# Patient Record
Sex: FEMALE | Race: BLACK OR AFRICAN AMERICAN | NOT HISPANIC OR LATINO | Employment: FULL TIME | URBAN - METROPOLITAN AREA
[De-identification: names, ages, dates, MRNs, and addresses within clinical notes are randomized per-mention and may not be internally consistent; named-entity substitution may affect disease eponyms.]

---

## 2018-03-04 ENCOUNTER — HOSPITAL ENCOUNTER (EMERGENCY)
Facility: HOSPITAL | Age: 28
Discharge: HOME | End: 2018-03-04
Attending: EMERGENCY MEDICINE

## 2018-03-04 ENCOUNTER — APPOINTMENT (EMERGENCY)
Dept: RADIOLOGY | Facility: HOSPITAL | Age: 28
End: 2018-03-04

## 2018-03-04 VITALS
RESPIRATION RATE: 16 BRPM | SYSTOLIC BLOOD PRESSURE: 103 MMHG | WEIGHT: 165 LBS | DIASTOLIC BLOOD PRESSURE: 53 MMHG | HEIGHT: 66 IN | TEMPERATURE: 97.4 F | HEART RATE: 78 BPM | BODY MASS INDEX: 26.52 KG/M2 | OXYGEN SATURATION: 95 %

## 2018-03-04 DIAGNOSIS — M25.512 LEFT SHOULDER PAIN: Primary | ICD-10-CM

## 2018-03-04 PROCEDURE — 73030 X-RAY EXAM OF SHOULDER: CPT

## 2018-03-04 PROCEDURE — 99283 EMERGENCY DEPT VISIT LOW MDM: CPT

## 2018-03-04 RX ORDER — CYCLOBENZAPRINE HCL 10 MG
10 TABLET ORAL 3 TIMES DAILY PRN
Qty: 21 TABLET | Refills: 0 | Status: SHIPPED | OUTPATIENT
Start: 2018-03-04 | End: 2020-03-02

## 2018-03-04 RX ORDER — NAPROXEN 250 MG/1
500 TABLET ORAL ONCE
Status: COMPLETED | OUTPATIENT
Start: 2018-03-04 | End: 2018-03-04

## 2018-03-04 RX ORDER — ACETAMINOPHEN 325 MG/1
975 TABLET ORAL ONCE
Status: COMPLETED | OUTPATIENT
Start: 2018-03-04 | End: 2018-03-04

## 2018-03-04 RX ORDER — CYCLOBENZAPRINE HCL 10 MG
10 TABLET ORAL ONCE
Status: COMPLETED | OUTPATIENT
Start: 2018-03-04 | End: 2018-03-04

## 2018-03-04 RX ORDER — LIDOCAINE 50 MG/G
1 PATCH TOPICAL ONCE
Status: DISCONTINUED | OUTPATIENT
Start: 2018-03-04 | End: 2018-03-04 | Stop reason: HOSPADM

## 2018-03-04 RX ORDER — NAPROXEN 500 MG/1
500 TABLET ORAL 2 TIMES DAILY WITH MEALS
Qty: 20 TABLET | Refills: 0 | Status: SHIPPED | OUTPATIENT
Start: 2018-03-04 | End: 2020-03-02

## 2018-03-04 RX ADMIN — ACETAMINOPHEN 975 MG: 325 TABLET, FILM COATED ORAL at 12:38

## 2018-03-04 RX ADMIN — NAPROXEN 500 MG: 250 TABLET ORAL at 12:39

## 2018-03-04 RX ADMIN — CYCLOBENZAPRINE HYDROCHLORIDE 10 MG: 10 TABLET, FILM COATED ORAL at 12:39

## 2018-03-04 RX ADMIN — LIDOCAINE 1 PATCH: 50 PATCH TOPICAL at 12:40

## 2018-03-04 NOTE — ED PROVIDER NOTES
History  Chief Complaint   Patient presents with    Arm Pain     patient is c/o left arm pain since thursday  thinks she might have injured it carrying boxes at work on thursday  but unsure  denies radiation to chest or neck     59-year-old female without past medical history right-hand dominant presenting with chief complaint of left shoulder injury  Patient was at work on Thursday where she works as a  she went downstairs to grab a box of liquor and bring it back up stairs she states she placed on her left shoulder she has had after this she had significant pain localized to her anterior lateral shoulder, it got progressively worse since that time again is localized to her anterior lateral shoulder does radiate into her proximal humerus is worse when she tries to flex and abduct the arm she has difficulty with range of motion she has difficulty sleeping on that side because it is so uncomfortable she has minimal discomfort at rest, is reproduced with movement is localized to left shoulder only does not go into her chest or back or neck she states that no other exacerbating other than it is improved with Tylenol  No other associated symptoms she is not reported this to employer her last normal period was 2 weeks ago            None       History reviewed  No pertinent past medical history  Past Surgical History:   Procedure Laterality Date    DILATION AND CURETTAGE OF UTERUS         History reviewed  No pertinent family history  I have reviewed and agree with the history as documented  Social History   Substance Use Topics    Smoking status: Current Every Day Smoker     Packs/day: 0 50     Types: Cigarettes    Smokeless tobacco: Never Used    Alcohol use No        Review of Systems   Constitutional: Negative for chills and fever  Respiratory: Negative for cough and shortness of breath  Cardiovascular: Negative for chest pain and palpitations     Gastrointestinal: Negative for abdominal pain, nausea and vomiting  Genitourinary: Negative for dysuria, frequency, menstrual problem and urgency  Musculoskeletal: Negative for arthralgias, back pain, joint swelling, myalgias, neck pain and neck stiffness  Left shoulder pain   Skin: Negative for color change, rash and wound  Neurological: Negative for dizziness, weakness, light-headedness and headaches  Tingling and left arm and finger tips   Hematological: Negative for adenopathy  Does not bruise/bleed easily  Psychiatric/Behavioral: Negative for agitation and behavioral problems  All other systems reviewed and are negative  Physical Exam  ED Triage Vitals [03/04/18 1048]   Temperature Pulse Respirations Blood Pressure SpO2   (!) 97 4 °F (36 3 °C) 78 16 103/53 95 %      Temp Source Heart Rate Source Patient Position - Orthostatic VS BP Location FiO2 (%)   Oral Monitor Sitting Right arm --      Pain Score       8           Orthostatic Vital Signs  Vitals:    03/04/18 1048   BP: 103/53   Pulse: 78   Patient Position - Orthostatic VS: Sitting       Physical Exam   Constitutional: She is oriented to person, place, and time  She appears well-developed and well-nourished  No distress  HENT:   Head: Normocephalic and atraumatic  Eyes: EOM are normal  Pupils are equal, round, and reactive to light  Neck: Normal range of motion  Neck supple  No tracheal deviation present  Cardiovascular: Normal rate, regular rhythm and normal heart sounds  Exam reveals no gallop and no friction rub  No murmur heard  Pulmonary/Chest: Effort normal and breath sounds normal  She has no wheezes  She has no rales     Musculoskeletal:   Patient has minimal discomfort with passive range of motion she has significant discomfort or limitation with flexion abducting the left arm at the shoulder she has pain with external rotation there is no erythema warmth or swelling she has no tenderness over the humerus elbow or hand distal left upper extremities neurovascularly intact including ain PI and wrist flexion extension sensation is symmetric although again she states she has tingling all for fingers, there is no tenderness over her clavicle or scapula no deformity of the shoulder otherwise unremarkable muscle skeletal exam   Neurological: She is alert and oriented to person, place, and time  No cranial nerve deficit  She exhibits normal muscle tone  Coordination normal    Skin: Skin is warm and dry  No rash noted  Psychiatric: She has a normal mood and affect  Her behavior is normal    Nursing note and vitals reviewed  ED Medications  Medications   naproxen (NAPROSYN) tablet 500 mg (500 mg Oral Given 3/4/18 1239)   acetaminophen (TYLENOL) tablet 975 mg (975 mg Oral Given 3/4/18 1238)   cyclobenzaprine (FLEXERIL) tablet 10 mg (10 mg Oral Given 3/4/18 1239)       Diagnostic Studies  Results Reviewed     None                 XR shoulder 2+ views LEFT   ED Interpretation by Mayuri Roque DO (03/04 1325)   No acute abnormality      Final Result by Taisha Tovar MD (03/04 1342)      No acute osseous abnormality           Workstation performed: [id number]                    Procedures  Procedures       Phone Contacts  ED Phone Contact    ED Course  ED Course as of Mar 04 2241   Jaison Stringer Mar 04, 2018   1343 Will follow up with occupational medicine close return instructions patient agreeable plan, will follow up with occupational medicine this was identified is work related injury                                MDM  Number of Diagnoses or Management Options  Left shoulder pain:   Diagnosis management comments: 78-year-old female right-hand dominant presenting chief complaint of left shoulder injury that occurred while at work, pain is localized to her left anterior lateral shoulder worse when she tries to flex or abduct the arm is very reproducible, she does no tingling in her fingers but no neck pain no radiation to chest no other neurologic complaints on exam here she is afebrile normal vital signs she has reproducible left shoulder pain without swelling redness warmth or other acute infectious ischemic or inflammatory findings distal lower left upper extremitie objectively neurovascularly intact, will get x-ray of left shoulder, will treat symptomatically she will require Occupational Medicine follow-up return instructions as this is identified is work related injury    [address] Time    Disposition  Final diagnoses:   Left shoulder pain     Time reflects when diagnosis was documented in both MDM as applicable and the Disposition within this note     Time User Action Codes Description Comment    3/4/2018  1:45 PM Chayito Campbell Add [K03 561] Left shoulder pain       ED Disposition     ED Disposition Condition Comment    Discharge  Arturo Garcia discharge to home/self care  Condition at discharge: Good        Follow-up Information     Follow up With Specialties Details Why 14 [facility] Emergency Department Emergency Medicine  If symptoms worsen 34 [facility] [place] ED, [address] [place], [place], [address] In 2 days  [address], [address] [place] [address], [id number]        Discharge Medication List as of 3/4/2018  1:47 PM      START taking these medications    Details   cyclobenzaprine (FLEXERIL) 10 mg tablet Take 1 tablet (10 mg total) by mouth 3 (three) times a day as needed for muscle spasms for up to 7 doses, Starting Sun 3/4/2018, Print      naproxen (NAPROSYN) 500 mg tablet Take 1 tablet (500 mg total) by mouth 2 (two) times a day with meals, Starting Sun 3/4/2018, Print           No discharge procedures on file      ED Provider  Electronically Signed by           Jarad Reyna DO  03/04/18 1513

## 2018-03-04 NOTE — DISCHARGE INSTRUCTIONS
Please continue current treatment rest ice follow up with occupational medicine for further evaluation and re-evaluation which may include orthopedic evaluation physical therapy etc please notify your employer that this was a work related injury as discussed     Shoulder Pain, Ambulatory Care   GENERAL INFORMATION:   Shoulder pain  is a common problem and can affect your daily activities  Pain can be caused by a problem within your shoulder  Shoulder pain may also be caused by pain that spreads to your shoulder from another part of your body  Seek immediate care for the following symptoms:   · Severe pain    · Inability to move your arm or shoulder    · Numbness or tingling in your shoulder or arm  Treatment for shoulder pain  may include any of the following:  · Acetaminophen  decreases pain and fever  It is available without a doctor's order  Ask how much to take and how often to take it  Follow directions  Acetaminophen can cause liver damage if not taken correctly  · NSAIDs  help decrease swelling and pain or fever  This medicine is available with or without a doctor's order  NSAIDs can cause stomach bleeding or kidney problems in certain people  If you take blood thinner medicine, always ask your healthcare provider if NSAIDs are safe for you  Always read the medicine label and follow directions  · A steroid injection  may help decrease pain and swelling  · Surgery  may be needed for long-term pain and loss of function  Manage your symptoms:   · Apply ice  on your shoulder for 20 to 30 minutes every 2 hours or as directed  Use an ice pack, or put crushed ice in a plastic bag  Cover it with a towel  Ice helps prevent tissue damage and decreases swelling and pain  · Apply heat if ice does not help your symptoms  Apply heat on your shoulder for 20 to 30 minutes every 2 hours for as many days as directed  Heat helps decrease pain and muscle spasms      · Go to physical or occupational therapy as directed  A physical therapist teaches you exercises to help improve movement and strength, and to decrease pain  An occupational therapist teaches you skills to help with your daily activities  Prevent shoulder pain:   · Stretch and strengthen your shoulder  Use proper technique during exercises and sports  · Limit activities as directed  Try to avoid repeated overhead movements  Follow up with your healthcare provider or orthopedist as directed:  Write down your questions so you remember to ask them during your visits  CARE AGREEMENT:   You have the right to help plan your care  Learn about your health condition and how it may be treated  Discuss treatment options with your caregivers to decide what care you want to receive  You always have the right to refuse treatment  The above information is an  only  It is not intended as medical advice for individual conditions or treatments  Talk to your doctor, nurse or pharmacist before following any medical regimen to see if it is safe and effective for you  © 2014 [address] is for End User's use only and may not be sold, redistributed or otherwise used for commercial purposes  All illustrations and images included in CareNotes® are the copyrighted property of [employer] M [employer]  or [employer]

## 2018-09-18 ENCOUNTER — APPOINTMENT (EMERGENCY)
Dept: RADIOLOGY | Facility: HOSPITAL | Age: 28
End: 2018-09-18

## 2018-09-18 ENCOUNTER — HOSPITAL ENCOUNTER (EMERGENCY)
Facility: HOSPITAL | Age: 28
Discharge: HOME | End: 2018-09-19
Attending: EMERGENCY MEDICINE | Admitting: EMERGENCY MEDICINE

## 2018-09-18 VITALS
HEART RATE: 72 BPM | TEMPERATURE: 98.1 F | BODY MASS INDEX: 25.83 KG/M2 | DIASTOLIC BLOOD PRESSURE: 56 MMHG | OXYGEN SATURATION: 99 % | WEIGHT: 160.05 LBS | SYSTOLIC BLOOD PRESSURE: 106 MMHG | RESPIRATION RATE: 18 BRPM

## 2018-09-18 DIAGNOSIS — S63.501A RIGHT WRIST SPRAIN: Primary | ICD-10-CM

## 2018-09-18 PROCEDURE — 73110 X-RAY EXAM OF WRIST: CPT

## 2018-09-19 PROBLEM — S63.501A RIGHT WRIST SPRAIN: Status: ACTIVE | Noted: 2018-09-19

## 2018-09-19 PROCEDURE — 99283 EMERGENCY DEPT VISIT LOW MDM: CPT

## 2018-09-19 NOTE — ED PROVIDER NOTES
History  Chief Complaint   Patient presents with    Hand Pain     Patient reports hand/wrist pain that radiates up arm  Patient reports that she injured it a few months ago and continues to have pain   Pain with Swallowing     Patient reports that she has difficulty swallowing some foods  Patient's 70-year-old female with complaints of right wrist pain that began approximately 5 months ago  Patient states that she fell on her wrist causing an injury  Patient states she has had persistent pain  Patient did not have an initial evaluation because she did not have insurance  Patient states that she notices swelling in her right wrist   She denies any numbness or tingling  History provided by:  Patient   used: No    Hand Pain   Associated symptoms: no chest pain, no fever and no shortness of breath    Wrist Pain   Location:  Right wrist  Severity:  Moderate  Duration:  5 months  Progression:  Unchanged  Relieved by:  Rest  Worsened by: Movement  Associated symptoms: no chest pain, no fever and no shortness of breath        Prior to Admission Medications   Prescriptions Last Dose Informant Patient Reported? Taking? cyclobenzaprine (FLEXERIL) 10 mg tablet   No No   Sig: Take 1 tablet (10 mg total) by mouth 3 (three) times a day as needed for muscle spasms for up to 7 doses   naproxen (NAPROSYN) 500 mg tablet   No No   Sig: Take 1 tablet (500 mg total) by mouth 2 (two) times a day with meals      Facility-Administered Medications: None       History reviewed  No pertinent past medical history  Past Surgical History:   Procedure Laterality Date    DILATION AND CURETTAGE OF UTERUS         History reviewed  No pertinent family history  I have reviewed and agree with the history as documented      Social History   Substance Use Topics    Smoking status: Current Every Day Smoker     Packs/day: 0 50     Types: Cigarettes    Smokeless tobacco: Never Used    Alcohol use Yes Comment: socially        Review of Systems   Constitutional: Negative for fever  Respiratory: Negative for shortness of breath  Cardiovascular: Negative for chest pain  Musculoskeletal: Positive for joint swelling  All other systems reviewed and are negative  Physical Exam  Physical Exam   Constitutional: She is oriented to person, place, and time  She appears well-developed and well-nourished  HENT:   Head: Normocephalic and atraumatic  Right Ear: External ear normal    Left Ear: External ear normal    Nose: Nose normal    Mouth/Throat: Oropharynx is clear and moist    Eyes: Conjunctivae and EOM are normal  Pupils are equal, round, and reactive to light  Neck: Normal range of motion  Cardiovascular: Normal rate, regular rhythm and normal heart sounds  Pulmonary/Chest: Effort normal and breath sounds normal    Musculoskeletal:        Right wrist: She exhibits tenderness and bony tenderness  She exhibits no swelling  Arms:  Neurological: She is alert and oriented to person, place, and time  Skin: Skin is warm  Vitals reviewed        Vital Signs  ED Triage Vitals [09/18/18 2220]   Temperature Pulse Respirations Blood Pressure SpO2   98 1 °F (36 7 °C) 72 18 106/56 99 %      Temp Source Heart Rate Source Patient Position - Orthostatic VS BP Location FiO2 (%)   Oral Monitor Sitting Left arm --      Pain Score       4           Vitals:    09/18/18 2220   BP: 106/56   Pulse: 72   Patient Position - Orthostatic VS: Sitting       Visual Acuity      ED Medications  Medications - No data to display    Diagnostic Studies  Results Reviewed     None                 XR wrist 3+ views RIGHT    (Results Pending)              Procedures  Procedures       Phone Contacts  ED Phone Contact    ED Course                               MDM  Number of Diagnoses or Management Options  Right wrist sprain:   Diagnosis management comments: DDX includes but not ltd to:  Right scaphoid fracture, right distal radius fracture, right wrist sprain  X-ray images of right wrist were reviewed and that show any acute bony abnormality  Recommended follow-up with Orthopedics for further evaluation  Patient was placed in a wrist brace for comfort and protection  Amount and/or Complexity of Data Reviewed  Tests in the radiology section of CPT®: ordered and reviewed  Independent visualization of images, tracings, or specimens: yes      CritCare Time    Disposition  Final diagnoses:   Right wrist sprain     Time reflects when diagnosis was documented in both MDM as applicable and the Disposition within this note     Time User Action Codes Description Comment    9/19/2018 12:12 AM Gini Villarreal Add [S63 501A] Right wrist sprain       ED Disposition     ED Disposition Condition Comment    Discharge  Nishant Foil discharge to home/self care  Condition at discharge: Good        Follow-up Information     Follow up With Specialties Details Why [address] [facility] Orthopedic Surgery In 1 week  [address] [facility] [address]  [phone]          Patient's Medications   Discharge Prescriptions    No medications on file     No discharge procedures on file      ED Provider  Electronically Signed by           Hema Kang PA-C  09/19/18 0026

## 2018-09-19 NOTE — DISCHARGE INSTRUCTIONS
Sprain   WHAT YOU NEED TO KNOW:   A sprain happens when a ligament is stretched or torn  Ligaments are tough tissues that connect bones  Ligaments support your joints and keep your bones in place  They allow you to lift, lower, or rotate your arms and legs  A sprain may involve one or more ligaments  DISCHARGE INSTRUCTIONS:   Seek care immediately if:   · You have numbness or tingling below the injury, such as in your fingers or toes  · The skin over your sprained area is blue or pale  · Your pain has increased or returned, even after you take pain medicine  Contact your healthcare provider if:   · Your symptoms do not better  · Your swelling has increased or returned  · Your joint becomes more weak or unstable  · You have questions or concerns about your condition or care  Medicines:   · Prescription pain medicine  may be given  Ask how to take this medicine safely  · Acetaminophen  decreases pain and fever  It is available without a doctor's order  Ask how much to take and how often to take it  Follow directions  Acetaminophen can cause liver damage if not taken correctly  · NSAIDs , such as ibuprofen, help decrease swelling, pain, and fever  This medicine is available with or without a doctor's order  NSAIDs can cause stomach bleeding or kidney problems in certain people  If you take blood thinner medicine, always ask your healthcare provider if NSAIDs are safe for you  Always read the medicine label and follow directions  · Take your medicine as directed  Contact your healthcare provider if you think your medicine is not helping or if you have side effects  Tell him or her if you are allergic to any medicine  Keep a list of the medicines, vitamins, and herbs you take  Include the amounts, and when and why you take them  Bring the list or the pill bottles to follow-up visits  Carry your medicine list with you in case of an emergency    Support devices:  Support services, such as an elastic bandage, splint, brace, or cast may be needed  These devices limit your movement and protect your joint  You may need to use crutches if the sprain is in your leg  This will help decrease your pain as you move around  Self-care:   · Rest  your joint so that it can heal  Return to normal activities as directed  · Apply ice  on your injury for 15 to 20 minutes every hour or as directed  Use an ice pack, or put crushed ice in a plastic bag  Cover it with a towel  Ice helps prevent tissue damage and decreases swelling and pain  · Compress  the injured area as directed  Ask your healthcare provider if you should wrap an elastic bandage around your injured ligament  An elastic bandage provides support and helps decrease swelling and movement so your joint can heal      · Elevate  the injured area above the level of your heart as often as you can  This will help decrease swelling and pain  Prop the injured area on pillows or blankets to keep it elevated comfortably  Physical therapy:  A physical therapist teaches you exercises to help improve movement and strength, and to decrease pain  Prevent another sprain:  Regular exercise can strengthen your muscles and help prevent another injury  Do the following before you begin or return to regular exercise or sports training:  · Ask your healthcare provider about the activities you can do  Find out how long your ligament needs to heal  Do not do any physical activity until your healthcare provider says it is okay  If you start activity too soon, you may develop a more serious injury  · Always warm up and stretch  before your regular exercise, sport, or physical activity  · Take it slow  Slowly increase how often and how long you exercise or train  Sudden increases in how often you train may cause you to overstretch or tear your ligament  · Use the right equipment  Always wear shoes that fit well and are made for the activity that you are doing   You may also use ankle supports, elbow and knee pads, or braces  Follow up with your healthcare provider as directed:  Write down your questions so you remember to ask them during your visits  © 2017 [address] Information is for End User's use only and may not be sold, redistributed or otherwise used for commercial purposes  All illustrations and images included in CareNotes® are the copyrighted property of [employer] M [employer]  or [employer]awn Nice  The above information is an  only  It is not intended as medical advice for individual conditions or treatments  Talk to your doctor, nurse or pharmacist before following any medical regimen to see if it is safe and effective for you

## 2018-12-16 ENCOUNTER — APPOINTMENT (EMERGENCY)
Dept: RADIOLOGY | Facility: HOSPITAL | Age: 28
End: 2018-12-16
Payer: COMMERCIAL

## 2018-12-16 ENCOUNTER — HOSPITAL ENCOUNTER (EMERGENCY)
Facility: HOSPITAL | Age: 28
Discharge: HOME | End: 2018-12-16
Attending: EMERGENCY MEDICINE | Admitting: EMERGENCY MEDICINE
Payer: COMMERCIAL

## 2018-12-16 VITALS
HEART RATE: 84 BPM | TEMPERATURE: 98.2 F | RESPIRATION RATE: 16 BRPM | DIASTOLIC BLOOD PRESSURE: 59 MMHG | OXYGEN SATURATION: 100 % | SYSTOLIC BLOOD PRESSURE: 126 MMHG

## 2018-12-16 DIAGNOSIS — J06.9 URI WITH COUGH AND CONGESTION: Primary | ICD-10-CM

## 2018-12-16 DIAGNOSIS — J02.9 SORE THROAT: ICD-10-CM

## 2018-12-16 PROCEDURE — 94640 AIRWAY INHALATION TREATMENT: CPT

## 2018-12-16 PROCEDURE — 71046 X-RAY EXAM CHEST 2 VIEWS: CPT

## 2018-12-16 PROCEDURE — 99283 EMERGENCY DEPT VISIT LOW MDM: CPT

## 2018-12-16 RX ORDER — PSEUDOEPHEDRINE HYDROCHLORIDE 30 MG/1
60 TABLET ORAL ONCE
Status: DISCONTINUED | OUTPATIENT
Start: 2018-12-16 | End: 2018-12-16

## 2018-12-16 RX ORDER — PSEUDOEPHEDRINE HYDROCHLORIDE 30 MG/1
60 TABLET ORAL ONCE
Status: COMPLETED | OUTPATIENT
Start: 2018-12-16 | End: 2018-12-16

## 2018-12-16 RX ORDER — PREDNISONE 20 MG/1
60 TABLET ORAL ONCE
Status: DISCONTINUED | OUTPATIENT
Start: 2018-12-16 | End: 2018-12-16

## 2018-12-16 RX ORDER — IBUPROFEN 600 MG/1
600 TABLET ORAL ONCE
Status: DISCONTINUED | OUTPATIENT
Start: 2018-12-16 | End: 2018-12-16

## 2018-12-16 RX ORDER — ALBUTEROL SULFATE 2.5 MG/3ML
2.5 SOLUTION RESPIRATORY (INHALATION) EVERY 6 HOURS PRN
Qty: 75 ML | Refills: 0 | Status: SHIPPED | OUTPATIENT
Start: 2018-12-16 | End: 2020-03-02

## 2018-12-16 RX ORDER — AZITHROMYCIN 250 MG/1
TABLET, FILM COATED ORAL
Qty: 6 TABLET | Refills: 0 | Status: SHIPPED | OUTPATIENT
Start: 2018-12-16 | End: 2018-12-20

## 2018-12-16 RX ORDER — ALBUTEROL SULFATE 90 UG/1
2 AEROSOL, METERED RESPIRATORY (INHALATION) ONCE
Status: COMPLETED | OUTPATIENT
Start: 2018-12-16 | End: 2018-12-16

## 2018-12-16 RX ORDER — IBUPROFEN 600 MG/1
600 TABLET ORAL ONCE
Status: COMPLETED | OUTPATIENT
Start: 2018-12-16 | End: 2018-12-16

## 2018-12-16 RX ADMIN — DEXAMETHASONE SODIUM PHOSPHATE 10 MG: 10 INJECTION, SOLUTION INTRAMUSCULAR; INTRAVENOUS at 14:58

## 2018-12-16 RX ADMIN — PSEUDOEPHEDRINE HCL 60 MG: 30 TABLET, COATED ORAL at 14:58

## 2018-12-16 RX ADMIN — IPRATROPIUM BROMIDE 0.5 MG: 0.5 SOLUTION RESPIRATORY (INHALATION) at 14:59

## 2018-12-16 RX ADMIN — ALBUTEROL SULFATE 2 PUFF: 90 AEROSOL, METERED RESPIRATORY (INHALATION) at 15:59

## 2018-12-16 RX ADMIN — IBUPROFEN 600 MG: 600 TABLET ORAL at 14:58

## 2018-12-16 RX ADMIN — ALBUTEROL SULFATE 5 MG: 2.5 SOLUTION RESPIRATORY (INHALATION) at 14:59

## 2018-12-16 NOTE — ED PROVIDER NOTES
History  Chief Complaint   Patient presents with    Cough     x a few days    Cold Like Symptoms     Patient is a 35-year-old female with no significant past medical history, presents to the emergency department complaining of URI symptoms for the past 2-3 days  Patient reports she has had cough, runny nose, congestion, sore throat and sneezing for the past 2-3 days  She also reports some intermittent chest tightness and mild dyspnea  Her significant other and significant other son have been sick with similar symptoms  No other recent sick contacts and no recent travel outside the country  Denies smoking history  She denies any other symptoms such as fever, chills, headache, dizziness or near syncope, ear pain or discharge, neck pain or stiffness, chest pain, palpitations, audible wheezing or stridor, abdominal pain, nausea, vomiting, diarrhea, constipation, blood per rectum or melena, urinary symptoms, skin rash or color change, extremity weakness or paresthesia or other focal neurologic deficits  History provided by:  Patient   used: No        Prior to Admission Medications   Prescriptions Last Dose Informant Patient Reported? Taking? cyclobenzaprine (FLEXERIL) 10 mg tablet   No No   Sig: Take 1 tablet (10 mg total) by mouth 3 (three) times a day as needed for muscle spasms for up to 7 doses   naproxen (NAPROSYN) 500 mg tablet   No No   Sig: Take 1 tablet (500 mg total) by mouth 2 (two) times a day with meals      Facility-Administered Medications: None       History reviewed  No pertinent past medical history  Past Surgical History:   Procedure Laterality Date    DILATION AND CURETTAGE OF UTERUS         History reviewed  No pertinent family history  I have reviewed and agree with the history as documented      Social History   Substance Use Topics    Smoking status: Current Every Day Smoker     Packs/day: 0 50     Types: Cigarettes    Smokeless tobacco: Never Used   [place] Alcohol use Yes      Comment: socially        Review of Systems   Constitutional: Negative for chills and fever  HENT: Positive for congestion, rhinorrhea, sneezing and sore throat  Negative for ear discharge, ear pain, trouble swallowing and voice change  Eyes: Negative for pain and visual disturbance  Respiratory: Positive for cough, chest tightness and shortness of breath  Negative for wheezing  Cardiovascular: Negative for chest pain and palpitations  Gastrointestinal: Negative for abdominal pain, constipation, diarrhea, nausea and vomiting  Genitourinary: Negative for dysuria, flank pain, frequency and hematuria  Musculoskeletal: Negative for back pain, neck pain and neck stiffness  Skin: Negative for color change, pallor and rash  Allergic/Immunologic: Negative for immunocompromised state  Neurological: Negative for dizziness, syncope, weakness, light-headedness, numbness and headaches  Hematological: Negative for adenopathy  Psychiatric/Behavioral: Negative for confusion and decreased concentration  All other systems reviewed and are negative  Physical Exam  Physical Exam   Constitutional: She is oriented to person, place, and time  She appears well-developed and well-nourished  No distress  HENT:   Head: Normocephalic and atraumatic  Right Ear: External ear normal    Left Ear: External ear normal    Mouth/Throat: Oropharynx is clear and moist  No oropharyngeal exudate  Bilateral tonsillar hypertrophy without exudate  Eyes: Pupils are equal, round, and reactive to light  Conjunctivae and EOM are normal    Neck: Normal range of motion  Neck supple  No JVD present  Submandibular lymphadenopathy  Cardiovascular: Normal rate, regular rhythm, normal heart sounds and intact distal pulses  Exam reveals no gallop and no friction rub  No murmur heard  Pulmonary/Chest: Effort normal  No respiratory distress  She has no wheezes  She has no rales   She exhibits no tenderness  Decreased breath sounds bilateral lung bases  Abdominal: Soft  Bowel sounds are normal  She exhibits no distension  There is no tenderness  There is no rebound and no guarding  Musculoskeletal: Normal range of motion  She exhibits no edema or tenderness  Lymphadenopathy:     She has cervical adenopathy  Neurological: She is alert and oriented to person, place, and time  No gross motor or sensory deficits  Skin: Skin is warm and dry  No rash noted  She is not diaphoretic  No erythema  No pallor  Psychiatric: She has a normal mood and affect  Her behavior is normal    Nursing note and vitals reviewed        Vital Signs  ED Triage Vitals [12/16/18 1351]   Temperature Pulse Respirations Blood Pressure SpO2   98 2 °F (36 8 °C) 84 16 126/59 100 %      Temp Source Heart Rate Source Patient Position - Orthostatic VS BP Location FiO2 (%)   Oral Monitor Sitting Left arm --      Pain Score       No Pain         Vitals:    12/16/18 1351   BP: 126/59   BP Location: Left arm   Pulse: 84   Resp: 16   Temp: 98 2 °F (36 8 °C)   TempSrc: Oral   SpO2: 100%     Visual Acuity      ED Medications  Medications   albuterol (PROVENTIL HFA,VENTOLIN HFA) inhaler 2 puff (not administered)   albuterol inhalation solution 5 mg (5 mg Nebulization Given 12/16/18 1459)   ipratropium (ATROVENT) 0 02 % inhalation solution 0 5 mg (0 5 mg Nebulization Given 12/16/18 1459)   ibuprofen (MOTRIN) tablet 600 mg (600 mg Oral Given 12/16/18 1458)   dexamethasone 10 mg/mL oral liquid 10 mg 1 mL (10 mg Oral Given 12/16/18 1458)   pseudoephedrine (SUDAFED) tablet 60 mg (60 mg Oral Given 12/16/18 1458)       Diagnostic Studies  Results Reviewed     None                 X-ray chest 2 views   ED Interpretation by Rene Howe DO (12/16 1538)   No acute abnormality in the chest                  Procedures  Procedures       Phone Contacts  ED Phone Contact    ED Course                               MDM  Number of Diagnoses or Management Options  Diagnosis management comments: 15-year-old female presents with 2-3 days of cough and URI symptoms  Patient's girlfriend and girlfriend's son have had similar symptoms and likely this is viral in etiology  Will perform x-ray of the chest to rule out pneumonia  Will treat symptomatically with Sudafed, Decadron and ibuprofen  Also give DuoNeb breathing treatment  Ultimately advised close PCP follow-up  Discussed ED return parameters  Amount and/or Complexity of Data Reviewed  Tests in the radiology section of CPT®: ordered and reviewed  Independent visualization of images, tracings, or specimens: yes      CritCare Time    Disposition  Final diagnoses:   URI with cough and congestion   Sore throat     Time reflects when diagnosis was documented in both MDM as applicable and the Disposition within this note     Time User Action Codes Description Comment    12/16/2018  3:56 PM Gregory Thomas [J06 [id number]] URI with cough and congestion     12/16/2018  3:56 PM Gregory Thomas [[id number]] Sore throat       ED Disposition     ED Disposition Condition Comment    Discharge  [facility] discharge to home/self care      Condition at discharge: Stable        Follow-up Information     Follow up With Specialties Details Why Contact Info Additional Information    Infolink  Call to establish care with a primary care doctor if you do not already have one [facility] [address] [facility] Emergency Department Emergency Medicine Go to If symptoms worsen [address] [id number]  [phone] [place] ED, [address] [place] [address], [id number]          Patient's Medications   Discharge Prescriptions    ALBUTEROL (2 5 MG/3 ML) 0 083 % NEBULIZER SOLUTION    Take 1 vial (2 5 mg total) by nebulization every 6 (six) hours as needed for wheezing or shortness of breath       Start Date: 12/16/2018End Date: --       Order Dose: 2 5 mg       Quantity: 75 mL    Refills: 0 AZITHROMYCIN (ZITHROMAX Z-FRANCISCO) 250 MG TABLET    Take 2 tablets today then 1 tablet daily x 4 days       Start Date: 12/16/2018End Date: 12/20/2018       Order Dose: --       Quantity: 6 tablet    Refills: 0     No discharge procedures on file      ED Provider  Electronically Signed by           Radames England DO  12/16/18 6063

## 2018-12-16 NOTE — DISCHARGE INSTRUCTIONS
Upper Respiratory Infection   WHAT YOU NEED TO KNOW:   An upper respiratory infection is also called the common cold  It is an infection that can affect your nose, throat, ears, and sinuses  For healthy people, the common cold is usually not serious and does not need special treatment  Cold symptoms are usually worst for the first 3 to 5 days  Most people get better in 7 to 14 days  You may continue to cough for 2 to 3 weeks  Colds are caused by viruses and do not get better with antibiotics  DISCHARGE INSTRUCTIONS:   Seek care immediately if:   · You have chest pain or trouble breathing  Contact your healthcare provider if:   · You have a fever over 102ºF (39°C)  · Your sore throat gets worse or you see white or yellow spots in your throat  · Your symptoms get worse after 3 to 5 days or your cold is not better in 14 days  · You have a rash anywhere on your skin  · You have large, tender lumps in your neck  · You have thick, green, or yellow drainage from your nose  · You cough up thick yellow, green, or bloody mucus  · You are vomiting for more than 24 hours and cannot keep fluids down  · You have a bad earache  · You have questions or concerns about your condition or care  Medicines: You may need any of the following:  · Decongestants  help reduce nasal congestion and help you breathe more easily  If you take decongestant pills, they may make you feel restless or cause problems with your sleep  Do not use decongestant sprays for more than a few days  · Cough suppressants  help reduce coughing  Ask your healthcare provider which type of cough medicine is best for you  · NSAIDs , such as ibuprofen, help decrease swelling, pain, and fever  NSAIDs can cause stomach bleeding or kidney problems in certain people  If you take blood thinner medicine, always ask your healthcare provider if NSAIDs are safe for you   Always read the medicine label and follow directions  · Acetaminophen  decreases pain and fever  It is available without a doctor's order  Ask how much to take and how often to take it  Follow directions  Read the labels of all other medicines you are using to see if they also contain acetaminophen, or ask your doctor or pharmacist  Acetaminophen can cause liver damage if not taken correctly  Do not use more than 4 grams (4,000 milligrams) total of acetaminophen in one day  · Take your medicine as directed  Contact your healthcare provider if you think your medicine is not helping or if you have side effects  Tell him or her if you are allergic to any medicine  Keep a list of the medicines, vitamins, and herbs you take  Include the amounts, and when and why you take them  Bring the list or the pill bottles to follow-up visits  Carry your medicine list with you in case of an emergency  Follow up with your healthcare provider as directed:  Write down your questions so you remember to ask them during your visits  Self-care:   · Rest as much as possible  Slowly start to do more each day  · Drink more liquids as directed  Liquids will help thin and loosen mucus so you can cough it up  Liquids will also help prevent dehydration  Liquids that help prevent dehydration include water, fruit juice, and broth  Do not drink liquids that contain caffeine  Caffeine can increase your risk for dehydration  Ask your healthcare provider how much liquid to drink each day  · Soothe a sore throat  Gargle with warm salt water  This helps your sore throat feel better  Make salt water by dissolving ¼ teaspoon salt in 1 cup warm water  You may also suck on hard candy or throat lozenges  You may use a sore throat spray  · Use a humidifier or vaporizer  Use a cool mist humidifier or a vaporizer to increase air moisture in your home  This may make it easier for you to breathe and help decrease your cough  · Use saline nasal drops as directed    These help relieve congestion  · Apply petroleum-based jelly around the outside of your nostrils  This can decrease irritation from blowing your nose  · Do not smoke  Nicotine and other chemicals in cigarettes and cigars can make your symptoms worse  They can also cause infections such as bronchitis or pneumonia  Ask your healthcare provider for information if you currently smoke and need help to quit  E-cigarettes or smokeless tobacco still contain nicotine  Talk to your healthcare provider before you use these products  Prevent spreading your cold to others:   · Try to stay away from other people during the first 2 to 3 days of your cold when it is more easily spread  · Do not share food or drinks  · Do not share hand towels with household members  · Wash your hands often, especially after you blow your nose  Turn away from other people and cover your mouth and nose with a tissue when you sneeze or cough  © 2017 [facility] Information is for End User's use only and may not be sold, redistributed or otherwise used for commercial purposes  All illustrations and images included in CareNotes® are the copyrighted property of [employer]  or [employer]  The above information is an  only  It is not intended as medical advice for individual conditions or treatments  Talk to your doctor, nurse or pharmacist before following any medical regimen to see if it is safe and effective for you  Pharyngitis   WHAT YOU NEED TO KNOW:   Pharyngitis, or sore throat, is inflammation of the tissues and structures in your pharynx (throat)  Pharyngitis is most often caused by bacteria  It may also be caused by a cold or flu virus  Other causes include smoking, allergies, or acid reflux  DISCHARGE INSTRUCTIONS:   Call 911 for any of the following:   · You have trouble breathing or swallowing because your throat is swollen or sore      Return to the emergency department if:   · You are drooling because it hurts too much to swallow  · Your fever is higher than 102? F (39?C) or lasts longer than 3 days  · You are confused  · You taste blood in your throat  Contact your healthcare provider if:   · Your throat pain gets worse  · You have a painful lump in your throat that does not go away after 5 days  · Your symptoms do not improve after 5 days  · You have questions or concerns about your condition or care  Medicines:  Viral pharyngitis will go away on its own without treatment  Your sore throat should start to feel better in 3 to 5 days for both viral and bacterial infections  You may need any of the following:  · Antibiotics  treat a bacterial infection  · NSAIDs , such as ibuprofen, help decrease swelling, pain, and fever  NSAIDs can cause stomach bleeding or kidney problems in certain people  If you take blood thinner medicine, always ask your healthcare provider if NSAIDs are safe for you  Always read the medicine label and follow directions  · Acetaminophen  decreases pain and fever  It is available without a doctor's order  Ask how much to take and how often to take it  Follow directions  Acetaminophen can cause liver damage if not taken correctly  · Take your medicine as directed  Contact your healthcare provider if you think your medicine is not helping or if you have side effects  Tell him or her if you are allergic to any medicine  Keep a list of the medicines, vitamins, and herbs you take  Include the amounts, and when and why you take them  Bring the list or the pill bottles to follow-up visits  Carry your medicine list with you in case of an emergency  Manage your symptoms:   · Gargle salt water  Mix ¼ teaspoon salt in an 8 ounce glass of warm water and gargle  This may help decrease swelling in your throat  · Drink liquids as directed    You may need to drink more liquids than usual  Liquids may help soothe your throat and prevent dehydration  Ask how much liquid to drink each day and which liquids are best for you  · Use a cool-steam humidifier  to help moisten the air in your room and calm your cough  · Soothe your throat  with cough drops, ice, soft foods, or popsicles  Prevent the spread of pharyngitis:  Cover your mouth and nose when you cough or sneeze  Do not share food or drinks  Wash your hands often  Use soap and water  If soap and water are unavailable, use an alcohol based hand   Follow up with your healthcare provider as directed:  Write down your questions so you remember to ask them during your visits  © 2017 [facility] Information is for End User's use only and may not be sold, redistributed or otherwise used for commercial purposes  All illustrations and images included in CareNotes® are the copyrighted property of [employer]  or [employer]awn [employer]  The above information is an  only  It is not intended as medical advice for individual conditions or treatments  Talk to your doctor, nurse or pharmacist before following any medical regimen to see if it is safe and effective for you

## 2019-10-15 ENCOUNTER — HOSPITAL ENCOUNTER (EMERGENCY)
Facility: HOSPITAL | Age: 29
Discharge: HOME | End: 2019-10-15
Attending: EMERGENCY MEDICINE | Admitting: EMERGENCY MEDICINE
Payer: COMMERCIAL

## 2019-10-15 ENCOUNTER — APPOINTMENT (EMERGENCY)
Dept: RADIOLOGY | Facility: HOSPITAL | Age: 29
End: 2019-10-15
Payer: COMMERCIAL

## 2019-10-15 VITALS
TEMPERATURE: 97.3 F | RESPIRATION RATE: 18 BRPM | OXYGEN SATURATION: 100 % | HEART RATE: 75 BPM | WEIGHT: 155.87 LBS | SYSTOLIC BLOOD PRESSURE: 111 MMHG | HEIGHT: 66 IN | DIASTOLIC BLOOD PRESSURE: 59 MMHG | BODY MASS INDEX: 25.05 KG/M2

## 2019-10-15 DIAGNOSIS — S90.112A CONTUSION OF GREAT TOE OF LEFT FOOT: Primary | ICD-10-CM

## 2019-10-15 PROCEDURE — 73660 X-RAY EXAM OF TOE(S): CPT

## 2019-10-15 PROCEDURE — 99283 EMERGENCY DEPT VISIT LOW MDM: CPT | Performed by: EMERGENCY MEDICINE

## 2019-10-15 PROCEDURE — 99283 EMERGENCY DEPT VISIT LOW MDM: CPT

## 2019-10-15 RX ORDER — IBUPROFEN 400 MG/1
400 TABLET ORAL ONCE
Status: COMPLETED | OUTPATIENT
Start: 2019-10-15 | End: 2019-10-15

## 2019-10-15 RX ADMIN — IBUPROFEN 400 MG: 400 TABLET ORAL at 18:00

## 2019-10-15 NOTE — DISCHARGE INSTRUCTIONS
Foot Contusion   WHAT YOU NEED TO KNOW:   A foot contusion is a bruise to the foot  DISCHARGE INSTRUCTIONS:   Medicines:   · NSAIDs:  These medicines decrease swelling and pain  NSAIDs are available without a doctor's order  Ask your healthcare provider which medicine is right for you  Ask how much to take and when to take it  Take as directed  NSAIDs can cause stomach bleeding and kidney problems if not taken correctly  · Take your medicine as directed  Contact your healthcare provider if you think your medicine is not helping or if you have side effects  Tell him of her if you are allergic to any medicine  Keep a list of the medicines, vitamins, and herbs you take  Include the amounts, and when and why you take them  Bring the list or the pill bottles to follow-up visits  Carry your medicine list with you in case of an emergency  Follow up with your healthcare provider as directed:  Write down your questions so you remember to ask them during your visits  Care for your foot: Follow your treatment plan to help decrease your pain and improve your muscle movement  · Rest:  You will need to rest your foot for 1 to 2 days after your injury  This will help decrease the risk of more damage  · Ice:  Ice helps decrease swelling and pain  Ice may also help prevent tissue damage  Use an ice pack, or put crushed ice in a plastic bag  Cover it with a towel and place it on your foot for 15 to 20 minutes every hour or as directed  · Compression:  Compression (tight hold) provides support and helps decrease swelling and movement so your foot can heal  You may be told to keep your foot wrapped with a tight elastic bandage  Follow instructions about how to apply your bandage  Do not massage your foot  You could cause more damage or pain  · Elevation:  Keep your foot raised above the level of your heart while you are sitting or lying down  This will help decrease or limit swelling   Use pillows, blankets, or rolled towels to elevate your foot comfortably  Exercise your foot:  You may be given gentle exercises to improve your foot movement and help decrease stiffness  Ask when you can return to your normal activities or sports  Prevent another injury:   · Wear equipment to protect yourself when you play sports  · Make sure your shoes fit properly  · Always wear shoes on streets or sidewalks  · Clean spills off the floor right away to avoid slipping or hitting your foot  · Make sure your home is well lit when you get up during the night  This will help you avoid hurting your foot in the dark  Contact your healthcare provider if:   · You have increased swelling on your foot  · You have severe foot pain  · You are not able to move your foot  · You have questions or concerns about your injury or treatment  © 2017 [employer]  Information is for End User's use only and may not be sold, redistributed or otherwise used for commercial purposes  All illustrations and images included in CareNotes® are the copyrighted property of [employer]  or [employer]awn [employer]  The above information is an  only  It is not intended as medical advice for individual conditions or treatments  Talk to your doctor, nurse or pharmacist before following any medical regimen to see if it is safe and effective for you

## 2019-10-15 NOTE — ED PROVIDER NOTES
History  Chief Complaint   Patient presents with    Toe Injury     pt states "the sliding door landed on my toe " Pt c/o left great toe pain     Patient is a 41-year-old female with no significant past medical or surgical history, presents to the emergency department complaining of left great toe pain after the sliding glass door of her shower fell on top of her left great toe approximately 2 days ago  Patient reports she immediately was able to catch the shower door as a came down and lifted off of her and denies that there was any broken glass  She states yesterday at work she is having trouble putting weight on the left foot and toe which prompted her to come for evaluation  She localizes the pain to the great toe as well as the area in between the 1st and 2nd toes of the left foot  She reports numbness and tingling along the medial aspect of her foot  She denies any ecchymosis, skin breakdown, significant foot swelling or other skin changes  Denies any pain in the proximal foot, ankle joint or proximal left leg  She denies any other injuries or complaints at this time and rest of review of systems is negative  History provided by:  Patient   used: No        Prior to Admission Medications   Prescriptions Last Dose Informant Patient Reported? Taking? albuterol (2 5 mg/3 mL) 0 083 % nebulizer solution   No No   Sig: Take 1 vial (2 5 mg total) by nebulization every 6 (six) hours as needed for wheezing or shortness of breath   cyclobenzaprine (FLEXERIL) 10 mg tablet   No No   Sig: Take 1 tablet (10 mg total) by mouth 3 (three) times a day as needed for muscle spasms for up to 7 doses   naproxen (NAPROSYN) 500 mg tablet   No No   Sig: Take 1 tablet (500 mg total) by mouth 2 (two) times a day with meals      Facility-Administered Medications: None       History reviewed  No pertinent past medical history      Past Surgical History:   Procedure Laterality Date    DILATION AND CURETTAGE OF UTERUS         History reviewed  No pertinent family history  I have reviewed and agree with the history as documented  Social History     Tobacco Use    Smoking status: Current Every Day Smoker     Packs/day: 0 50     Types: Cigarettes    Smokeless tobacco: Never Used   Substance Use Topics    Alcohol use: Yes     Comment: socially    Drug use: Yes     Types: Marijuana        Review of Systems   Constitutional: Negative for chills and fever  HENT: Negative for congestion, ear pain, rhinorrhea and sore throat  Respiratory: Negative for cough and shortness of breath  Cardiovascular: Negative for chest pain and palpitations  Gastrointestinal: Negative for abdominal pain, constipation, diarrhea, nausea and vomiting  Genitourinary: Negative for dysuria, flank pain, frequency and hematuria  Musculoskeletal: Positive for arthralgias and gait problem  Negative for joint swelling  +Left great toe pain/injury   Skin: Negative for color change, pallor and wound  Allergic/Immunologic: Negative for immunocompromised state  Neurological: Negative for dizziness, weakness, light-headedness, numbness and headaches  Hematological: Does not bruise/bleed easily  Psychiatric/Behavioral: Negative for confusion and decreased concentration  All other systems reviewed and are negative  Physical Exam  Physical Exam   Constitutional: She is oriented to person, place, and time  She appears well-developed and well-nourished  No distress  HENT:   Head: Normocephalic and atraumatic  Mouth/Throat: Oropharynx is clear and moist    Eyes: Pupils are equal, round, and reactive to light  Conjunctivae and EOM are normal    Neck: Normal range of motion  No JVD present  Cardiovascular: Normal rate, regular rhythm, normal heart sounds and intact distal pulses  Pulmonary/Chest: Effort normal and breath sounds normal  No respiratory distress  Abdominal: Soft  She exhibits no distension   There is no tenderness  Musculoskeletal: Normal range of motion  She exhibits tenderness  She exhibits no edema  Tenderness over the left 1st phalanx as well as the inter webspace between the left 1st and 2nd toes  No obvious or significant toe edema, no skin breakdown or ecchymosis  Patient able to wiggle all 5 toes easily  2+ DP and PT pulse  No other tenderness of the left foot or proximal bones of the left lower extremity  Neurological: She is alert and oriented to person, place, and time  No gross motor or sensory deficits  Skin: Skin is warm and dry  She is not diaphoretic  No erythema  No pallor  Psychiatric: She has a normal mood and affect  Her behavior is normal    Nursing note and vitals reviewed  Vital Signs  ED Triage Vitals [10/15/19 1652]   Temperature Pulse Respirations Blood Pressure SpO2   (!) 97 3 °F (36 3 °C) 75 18 111/59 100 %      Temp Source Heart Rate Source Patient Position - Orthostatic VS BP Location FiO2 (%)   Oral Monitor Sitting Left arm --      Pain Score       6           Vitals:    10/15/19 1652   BP: 111/59   Pulse: 75   Patient Position - Orthostatic VS: Sitting         Visual Acuity      ED Medications  Medications   ibuprofen (MOTRIN) tablet 400 mg (400 mg Oral Given 10/15/19 1800)       Diagnostic Studies  Results Reviewed     None                 XR toe great min 2 views LEFT   ED Interpretation by Ulices Rodney DO (10/15 1856)   No acute osseous abnormality  Procedures  Procedures       ED Course                               MDM  Number of Diagnoses or Management Options  Diagnosis management comments: 35-year-old female presents with left great toe injury after a sliding glass shower door fell on top of her toe  Differential includes contusion versus fracture  Will obtain x-ray of the left great toe, provide ibuprofen and ice  Will ultimately refer to Sports Medicine or Podiatry for follow-up         Amount and/or Complexity of Data Reviewed  Tests in the radiology section of CPT®: ordered and reviewed  Independent visualization of images, tracings, or specimens: yes        Disposition  Final diagnoses:   Contusion of great toe of left foot     Time reflects when diagnosis was documented in both MDM as applicable and the Disposition within this note     Time User Action Codes Description Comment    10/15/2019  7:00 PM Sharla Howell Add [S90 112A] Contusion of great toe of left foot       ED Disposition     ED Disposition Condition Date/Time Comment    Discharge Stable Tue Oct 15, 2019  7:00 PM Mindy Vora discharge to home/self care  Follow-up Information     Follow up With Specialties Details Why Contact Info Additional Information    [facility] Schedule an appointment as soon as possible for a visit   [facility] [phone]  [facility] [place] [address]  [address] [address] [facility] Emergency Medicine Go to  If symptoms worsen [address] [address] ED, [address], [place], [address] [facility]  Call  To establish care with a primary care doctor [phone]             Patient's Medications   Discharge Prescriptions    No medications on file     No discharge procedures on file      ED Provider  Electronically Signed by           Jess Huerta DO  10/15/19 3451

## 2019-10-16 NOTE — RESULT ENCOUNTER NOTE
Patient informed of xray results showing possible non displaced fracture of left great toe  Discussed continue prescribed treatment and splint and follow up with podiatry or sports medicine as instructed  Patient verbalized understanding of same  Call back complete

## 2020-03-02 ENCOUNTER — APPOINTMENT (EMERGENCY)
Dept: ULTRASOUND IMAGING | Facility: HOSPITAL | Age: 30
End: 2020-03-02
Payer: COMMERCIAL

## 2020-03-02 ENCOUNTER — HOSPITAL ENCOUNTER (EMERGENCY)
Facility: HOSPITAL | Age: 30
Discharge: HOME | End: 2020-03-02
Attending: EMERGENCY MEDICINE | Admitting: EMERGENCY MEDICINE
Payer: COMMERCIAL

## 2020-03-02 ENCOUNTER — APPOINTMENT (EMERGENCY)
Dept: CT IMAGING | Facility: HOSPITAL | Age: 30
End: 2020-03-02
Payer: COMMERCIAL

## 2020-03-02 VITALS
SYSTOLIC BLOOD PRESSURE: 113 MMHG | WEIGHT: 159 LBS | BODY MASS INDEX: 25.66 KG/M2 | HEART RATE: 59 BPM | RESPIRATION RATE: 18 BRPM | OXYGEN SATURATION: 100 % | TEMPERATURE: 98 F | DIASTOLIC BLOOD PRESSURE: 58 MMHG

## 2020-03-02 DIAGNOSIS — R10.9 RIGHT FLANK PAIN: Primary | ICD-10-CM

## 2020-03-02 DIAGNOSIS — D25.9 FIBROID, UTERINE: ICD-10-CM

## 2020-03-02 LAB
ALBUMIN SERPL BCP-MCNC: 3.1 G/DL (ref 3.5–5)
ALP SERPL-CCNC: 70 U/L (ref 46–116)
ALT SERPL W P-5'-P-CCNC: 16 U/L (ref 12–78)
ANION GAP SERPL CALCULATED.3IONS-SCNC: 6 MMOL/L (ref 4–13)
AST SERPL W P-5'-P-CCNC: 17 U/L (ref 5–45)
BASOPHILS # BLD AUTO: 0.05 THOUSANDS/ΜL (ref 0–0.1)
BASOPHILS NFR BLD AUTO: 1 % (ref 0–1)
BILIRUB SERPL-MCNC: 0.2 MG/DL (ref 0.2–1)
BILIRUB UR QL STRIP: NEGATIVE
BUN SERPL-MCNC: 11 MG/DL (ref 5–25)
CALCIUM SERPL-MCNC: 8.5 MG/DL (ref 8.3–10.1)
CHLORIDE SERPL-SCNC: 107 MMOL/L (ref 100–108)
CLARITY UR: CLEAR
CO2 SERPL-SCNC: 28 MMOL/L (ref 21–32)
COLOR UR: YELLOW
CREAT SERPL-MCNC: 0.79 MG/DL (ref 0.6–1.3)
EOSINOPHIL # BLD AUTO: 0.09 THOUSAND/ΜL (ref 0–0.61)
EOSINOPHIL NFR BLD AUTO: 2 % (ref 0–6)
ERYTHROCYTE [DISTWIDTH] IN BLOOD BY AUTOMATED COUNT: 19.3 % (ref 11.6–15.1)
EXT PREG TEST URINE: NEGATIVE
EXT. CONTROL ED NAV: NORMAL
GFR SERPL CREATININE-BSD FRML MDRD: 117 ML/MIN/1.73SQ M
GLUCOSE SERPL-MCNC: 80 MG/DL (ref 65–140)
GLUCOSE UR STRIP-MCNC: NEGATIVE MG/DL
HCT VFR BLD AUTO: 31.9 % (ref 34.8–46.1)
HGB BLD-MCNC: 9.2 G/DL (ref 11.5–15.4)
HGB UR QL STRIP.AUTO: NEGATIVE
IMM GRANULOCYTES # BLD AUTO: 0.02 THOUSAND/UL (ref 0–0.2)
IMM GRANULOCYTES NFR BLD AUTO: 0 % (ref 0–2)
KETONES UR STRIP-MCNC: NEGATIVE MG/DL
LEUKOCYTE ESTERASE UR QL STRIP: NEGATIVE
LIPASE SERPL-CCNC: 96 U/L (ref 73–393)
LYMPHOCYTES # BLD AUTO: 2.15 THOUSANDS/ΜL (ref 0.6–4.47)
LYMPHOCYTES NFR BLD AUTO: 37 % (ref 14–44)
MCH RBC QN AUTO: 21.5 PG (ref 26.8–34.3)
MCHC RBC AUTO-ENTMCNC: 28.8 G/DL (ref 31.4–37.4)
MCV RBC AUTO: 75 FL (ref 82–98)
MONOCYTES # BLD AUTO: 0.38 THOUSAND/ΜL (ref 0.17–1.22)
MONOCYTES NFR BLD AUTO: 7 % (ref 4–12)
NEUTROPHILS # BLD AUTO: 3.1 THOUSANDS/ΜL (ref 1.85–7.62)
NEUTS SEG NFR BLD AUTO: 53 % (ref 43–75)
NITRITE UR QL STRIP: NEGATIVE
NRBC BLD AUTO-RTO: 0 /100 WBCS
PH UR STRIP.AUTO: 5 [PH]
PLATELET # BLD AUTO: 337 THOUSANDS/UL (ref 149–390)
PMV BLD AUTO: 9.1 FL (ref 8.9–12.7)
POTASSIUM SERPL-SCNC: 3.9 MMOL/L (ref 3.5–5.3)
PROT SERPL-MCNC: 6.7 G/DL (ref 6.4–8.2)
PROT UR STRIP-MCNC: NEGATIVE MG/DL
RBC # BLD AUTO: 4.27 MILLION/UL (ref 3.81–5.12)
SODIUM SERPL-SCNC: 141 MMOL/L (ref 136–145)
SP GR UR STRIP.AUTO: 1.01 (ref 1–1.03)
UROBILINOGEN UR QL STRIP.AUTO: 0.2 E.U./DL
WBC # BLD AUTO: 5.79 THOUSAND/UL (ref 4.31–10.16)

## 2020-03-02 PROCEDURE — 85025 COMPLETE CBC W/AUTO DIFF WBC: CPT | Performed by: EMERGENCY MEDICINE

## 2020-03-02 PROCEDURE — 81003 URINALYSIS AUTO W/O SCOPE: CPT | Performed by: EMERGENCY MEDICINE

## 2020-03-02 PROCEDURE — 76856 US EXAM PELVIC COMPLETE: CPT

## 2020-03-02 PROCEDURE — 36415 COLL VENOUS BLD VENIPUNCTURE: CPT | Performed by: EMERGENCY MEDICINE

## 2020-03-02 PROCEDURE — 83690 ASSAY OF LIPASE: CPT | Performed by: EMERGENCY MEDICINE

## 2020-03-02 PROCEDURE — 99284 EMERGENCY DEPT VISIT MOD MDM: CPT

## 2020-03-02 PROCEDURE — 96374 THER/PROPH/DIAG INJ IV PUSH: CPT

## 2020-03-02 PROCEDURE — 99285 EMERGENCY DEPT VISIT HI MDM: CPT | Performed by: EMERGENCY MEDICINE

## 2020-03-02 PROCEDURE — 80053 COMPREHEN METABOLIC PANEL: CPT | Performed by: EMERGENCY MEDICINE

## 2020-03-02 PROCEDURE — 76830 TRANSVAGINAL US NON-OB: CPT

## 2020-03-02 PROCEDURE — 96376 TX/PRO/DX INJ SAME DRUG ADON: CPT

## 2020-03-02 PROCEDURE — 74177 CT ABD & PELVIS W/CONTRAST: CPT

## 2020-03-02 PROCEDURE — 81025 URINE PREGNANCY TEST: CPT | Performed by: EMERGENCY MEDICINE

## 2020-03-02 PROCEDURE — 96361 HYDRATE IV INFUSION ADD-ON: CPT

## 2020-03-02 RX ORDER — KETOROLAC TROMETHAMINE 30 MG/ML
15 INJECTION, SOLUTION INTRAMUSCULAR; INTRAVENOUS ONCE
Status: COMPLETED | OUTPATIENT
Start: 2020-03-02 | End: 2020-03-02

## 2020-03-02 RX ADMIN — IOHEXOL 100 ML: 350 INJECTION, SOLUTION INTRAVENOUS at 14:55

## 2020-03-02 RX ADMIN — SODIUM CHLORIDE 1000 ML: 0.9 INJECTION, SOLUTION INTRAVENOUS at 14:19

## 2020-03-02 RX ADMIN — KETOROLAC TROMETHAMINE 15 MG: 30 INJECTION, SOLUTION INTRAMUSCULAR at 17:04

## 2020-03-02 RX ADMIN — KETOROLAC TROMETHAMINE 15 MG: 30 INJECTION, SOLUTION INTRAMUSCULAR at 14:17

## 2020-03-03 NOTE — ED NOTES
Discharged reviewed with pt  Pt verbalized understanding and has no further questions at this time  Pt ambulatory off unit with steady gait       Merlyn Castelan RN  03/02/20 7431

## 2020-03-03 NOTE — ED PROVIDER NOTES
History  Chief Complaint   Patient presents with    Flank Pain     pt states "I went to the bathroom, felt intense pain and I had to lay down" pt c/o right flank pain     25-year-old female presents to the emergency room with right flank pain since yesterday  Patient states is a constant burning pain that waxes and wanes in severity  Nothing worsens or improves it  She states that it does radiate to her right lower quadrant  She reports that she is currently on her menstrual cycle denies any nausea/vomiting  She states that she does have urinary frequency and urinary urgency  States that she tried Midol and ibuprofen without any relief  She denies any fevers/chills, chest pain, shortness of breath, or diarrhea/constipation  Denies any history of similar symptoms in the past   No other complaints  History provided by:  Patient  Flank Pain   Pain location:  R flank  Pain quality: burning    Pain radiates to:  RLQ  Pain severity:  Moderate  Onset quality:  Sudden  Duration:  1 day  Timing:  Constant  Progression:  Worsening  Chronicity:  New  Context: not sick contacts    Relieved by:  Nothing  Worsened by:  Nothing  Ineffective treatments:  NSAIDs  Associated symptoms: no chest pain, no chills, no cough, no diarrhea, no dysuria, no fever, no hematuria, no nausea, no shortness of breath, no sore throat and no vomiting        None       History reviewed  No pertinent past medical history  Past Surgical History:   Procedure Laterality Date    DILATION AND CURETTAGE OF UTERUS         History reviewed  No pertinent family history  I have reviewed and agree with the history as documented      E-Cigarette/Vaping    E-Cigarette Use Never User      E-Cigarette/Vaping Substances     Social History     Tobacco Use    Smoking status: Current Every Day Smoker     Packs/day: 0 50     Types: Cigarettes    Smokeless tobacco: Never Used   Substance Use Topics    Alcohol use: Yes     Frequency: Monthly or less Comment: socially    Drug use: Yes     Types: Marijuana       Review of Systems   Constitutional: Negative for chills and fever  HENT: Negative for congestion, ear pain and sore throat  Eyes: Negative for pain and visual disturbance  Respiratory: Negative for cough, shortness of breath and wheezing  Cardiovascular: Negative for chest pain and leg swelling  Gastrointestinal: Negative for abdominal pain, diarrhea, nausea and vomiting  Genitourinary: Positive for flank pain  Negative for dysuria, frequency, hematuria and urgency  Musculoskeletal: Negative for neck pain and neck stiffness  Skin: Negative for rash and wound  Neurological: Negative for weakness, numbness and headaches  Psychiatric/Behavioral: Negative for agitation and confusion  All other systems reviewed and are negative  Physical Exam  Physical Exam   Constitutional: She is oriented to person, place, and time  She appears well-developed and well-nourished  HENT:   Head: Normocephalic and atraumatic  Mouth/Throat: Oropharynx is clear and moist    Eyes: Pupils are equal, round, and reactive to light  EOM are normal    Neck: Normal range of motion  Neck supple  Cardiovascular: Normal rate and regular rhythm  Pulmonary/Chest: Effort normal and breath sounds normal  No stridor  No respiratory distress  She has no wheezes  She has no rales  Abdominal: Soft  Bowel sounds are normal  She exhibits no distension  There is tenderness in the right lower quadrant  There is CVA tenderness  There is no rigidity, no rebound and no guarding  +right CVA tenderness    Musculoskeletal: Normal range of motion  Neurological: She is alert and oriented to person, place, and time  No focal deficits   Skin: Skin is warm and dry  Nursing note and vitals reviewed        Vital Signs  ED Triage Vitals [03/02/20 1208]   Temperature Pulse Respirations Blood Pressure SpO2   98 °F (36 7 °C) 101 18 119/56 100 %      Temp Source Heart Rate Source Patient Position - Orthostatic VS BP Location FiO2 (%)   Oral Monitor Sitting Right arm --      Pain Score       Worst Possible Pain           Vitals:    03/02/20 1630 03/02/20 1631 03/02/20 1705 03/02/20 1816   BP:  114/52 114/50 113/58   Pulse: 57 57 68 59   Patient Position - Orthostatic VS:  Sitting Sitting Sitting         Visual Acuity      ED Medications  Medications   sodium chloride 0 9 % bolus 1,000 mL (0 mL Intravenous Stopped 3/2/20 1628)   ketorolac (TORADOL) injection 15 mg (15 mg Intravenous Given 3/2/20 1417)   iohexol (OMNIPAQUE) 350 MG/ML injection (MULTI-DOSE) 100 mL (100 mL Intravenous Given 3/2/20 1455)   ketorolac (TORADOL) injection 15 mg (15 mg Intravenous Given 3/2/20 1704)       Diagnostic Studies  Results Reviewed     Procedure Component Value Units Date/Time    Comprehensive metabolic panel [[id number]]  (Abnormal) Collected:  03/02/20 1417    Lab Status:  Final result Specimen:  Blood from Arm, Right Updated:  03/02/20 1445     Sodium 141 mmol/L      Potassium 3 9 mmol/L      Chloride 107 mmol/L      CO2 28 mmol/L      ANION GAP 6 mmol/L      BUN 11 mg/dL      Creatinine 0 79 mg/dL      Glucose 80 mg/dL      Calcium 8 5 mg/dL      AST 17 U/L      ALT 16 U/L      Alkaline Phosphatase 70 U/L      Total Protein 6 7 g/dL      Albumin 3 1 g/dL      Total Bilirubin 0 20 mg/dL      eGFR 117 ml/min/1 73sq m     Narrative:       [facility] guidelines for Chronic Kidney Disease (CKD):     Stage 1 with normal or high GFR (GFR > 90 mL/min/1 73 square meters)    Stage 2 Mild CKD (GFR = 60-89 mL/min/1 73 square meters)    Stage 3A Moderate CKD (GFR = 45-59 mL/min/1 73 square meters)    Stage 3B Moderate CKD (GFR = 30-44 mL/min/1 73 square meters)    Stage 4 Severe CKD (GFR = 15-29 mL/min/1 73 square meters)    Stage 5 End Stage CKD (GFR <15 mL/min/1 73 square meters)  Note: GFR calculation is accurate only with a steady state creatinine    Lipase [[id number]]  (Normal) Collected:  03/02/20 1417    Lab Status:  Final result Specimen:  Blood from Arm, Right Updated:  03/02/20 1445     Lipase 96 u/L     CBC and differential [[id number]]  (Abnormal) Collected:  03/02/20 1417    Lab Status:  Final result Specimen:  Blood from Arm, Right Updated:  03/02/20 1426     WBC 5 79 Thousand/uL      RBC 4 27 Million/uL      Hemoglobin 9 2 g/dL      Hematocrit 31 9 %      MCV 75 fL      MCH 21 5 pg      MCHC 28 8 g/dL      RDW 19 3 %      MPV 9 1 fL      Platelets 022 Thousands/uL      nRBC 0 /100 WBCs      Neutrophils Relative 53 %      Immat GRANS % 0 %      Lymphocytes Relative 37 %      Monocytes Relative 7 %      Eosinophils Relative 2 %      Basophils Relative 1 %      Neutrophils Absolute 3 10 Thousands/µL      Immature Grans Absolute 0 02 Thousand/uL      Lymphocytes Absolute 2 15 Thousands/µL      Monocytes Absolute 0 38 Thousand/µL      Eosinophils Absolute 0 09 Thousand/µL      Basophils Absolute 0 05 Thousands/µL     UA w Reflex to Microscopic w Reflex to Culture [[id number]] Collected:  03/02/20 1226    Lab Status:  Final result Specimen:  Urine, Clean Catch Updated:  03/02/20 1234     Color, UA Yellow     Clarity, UA Clear     Specific Gravity, UA 1 015     pH, UA 5 0     Leukocytes, UA Negative     Nitrite, UA Negative     Protein, UA Negative mg/dl      Glucose, UA Negative mg/dl      Ketones, UA Negative mg/dl      Urobilinogen, UA 0 2 E U /dl      Bilirubin, UA Negative     Blood, UA Negative    POCT pregnancy, urine [[id number]]  (Normal) Resulted:  03/02/20 1229    Lab Status:  Final result Specimen:  Urine Updated:  03/02/20 1229     EXT PREG TEST UR (Ref: Negative) negative     Control valid                 US pelvis complete w transvaginal   ED Interpretation by Tera Bermudez DO (03/02 1913)   Heterogeneous uterine echotexture with large uterine body anterior left-sided 9 x 8 2 x 7 7 cm mass/fibroid        Final Result by Ronal Leone MD (03/02 1851)       Heterogeneous uterine echotexture with large uterine body anterior left-sided 9 x 8 2 x 7 7 cm mass/fibroid  Workstation performed: [id number]         CT abdomen pelvis with contrast   Final Result by Dutch Cruz MD (03/02 1544)      Large 9 cm left uterine mass /fibroid  Correlate with pelvic ultrasound  Workstation performed: [id number]                    Procedures  Procedures         ED Course  ED Course as of Mar 02 2027   Mon Mar 02, 2020   1405 Right flank pain x 1 day  Constant- waxes and wanes, burning pain  Urinary frequency  Urgency  Currently has menstrual cycle  Pain radiates to the rlq  Tried midol and ibuprofem without relief  MDM  Number of Diagnoses or Management Options  Right flank pain: new and requires workup  Diagnosis management comments: Patient with right flank pain  Will get labs, UA, urine preg, and CT abdomen/pelvis rule out intra-abdominal pathology  Will give Toradol for symptom relief  Patient reevaluated and feels improved  Patient updated on results of tests  Discharge instructions given including follow-up, and return precautions  Patient demonstrates verbal understanding and agrees with plan  Patient updated on incidental findings and was given a copy of her ultrasound results  She understands the need to follow-up with her OBGYN regards to this         Amount and/or Complexity of Data Reviewed  Clinical lab tests: ordered and reviewed  Tests in the radiology section of CPT®: ordered and reviewed  Tests in the medicine section of CPT®: ordered and reviewed  Discussion of test results with the performing providers: yes  Decide to obtain previous medical records or to obtain history from someone other than the patient: yes  Obtain history from someone other than the patient: yes  Review and summarize past medical records: yes  Discuss the patient with other providers: yes  Independent visualization of images, tracings, or specimens: yes    Patient Progress  Patient progress: improved        Disposition  Final diagnoses:   Right flank pain     Time reflects when diagnosis was documented in both MDM as applicable and the Disposition within this note     Time User Action Codes Description Comment    3/2/2020  5:18 PM Sera SMITH Add [D25 9] Fibroid, uterine     3/2/2020  7:32 PM Mateus Chavez Add [R10 9] Right flank pain       ED Disposition     ED Disposition Condition Date/Time Comment    Discharge Stable Mon Mar 2, 2020  7:32 PM Emir Salinas discharge to home/self care  Follow-up Information     Follow up With Specialties Details Why [address] [place] [address],  Internal Medicine Call in 1 day For follow-up as soon as possible if you do not already have a family doctor  [address] [place] [address] Dr Haley [address] [place] [address] OBGYN  Call in 1 day For follow-up as soon as possible for incidental findings           There are no discharge medications for this patient  No discharge procedures on file      PDMP Review     None          ED Provider  Electronically Signed by           Adam Perez DO  03/02/20 2027

## 2020-03-12 ENCOUNTER — OFFICE VISIT (OUTPATIENT)
Dept: GASTROENTEROLOGY | Facility: CLINIC | Age: 30
End: 2020-03-12
Payer: COMMERCIAL

## 2020-03-12 ENCOUNTER — TELEPHONE (OUTPATIENT)
Dept: GASTROENTEROLOGY | Facility: CLINIC | Age: 30
End: 2020-03-12

## 2020-03-12 VITALS
HEART RATE: 92 BPM | WEIGHT: 170 LBS | DIASTOLIC BLOOD PRESSURE: 70 MMHG | RESPIRATION RATE: 16 BRPM | BODY MASS INDEX: 27.32 KG/M2 | SYSTOLIC BLOOD PRESSURE: 100 MMHG | HEIGHT: 66 IN

## 2020-03-12 DIAGNOSIS — R10.31 RIGHT LOWER QUADRANT ABDOMINAL PAIN: Primary | ICD-10-CM

## 2020-03-12 DIAGNOSIS — R19.5 LOOSE STOOLS: ICD-10-CM

## 2020-03-12 DIAGNOSIS — R19.4 CHANGE IN BOWEL HABITS: ICD-10-CM

## 2020-03-12 PROCEDURE — 99244 OFF/OP CNSLTJ NEW/EST MOD 40: CPT | Performed by: INTERNAL MEDICINE

## 2020-03-12 RX ORDER — BACLOFEN 10 MG/1
TABLET ORAL
COMMUNITY
Start: 2019-12-12

## 2020-03-12 RX ORDER — FERROUS SULFATE 325(65) MG
325 TABLET ORAL
COMMUNITY
Start: 2020-03-06

## 2020-03-12 RX ORDER — PNV NO.95/FERROUS FUM/FOLIC AC 28MG-0.8MG
1 TABLET ORAL DAILY
COMMUNITY
Start: 2020-03-06

## 2020-03-12 RX ORDER — DICYCLOMINE HCL 20 MG
TABLET ORAL
COMMUNITY
Start: 2020-03-06

## 2020-03-12 NOTE — H&P (VIEW-ONLY)
[facility]    Dear Dr Ryan Torres,     I had the pleasure of seeing your patient Mango Arauz in the office today and I thank you for this kind referral        Chief Complaint:  Abdominal pain      HPI:  Mango Arauz is a 34 y o  female who presents with several months of lower abdominal pain  She points to the right lower quadrant and the right flank  CT scan revealed a fibroid uterus  However the patient also has a distinct change in bowel habits with loose stools  She has not seen any rectal bleeding  Ultrasound for the abdominal pain revealed only a small 4 mm gallbladder polyp  She has no melena or hematochezia  There is some decrease of the pain with a bowel movement  She has no nocturnal symptomatology  She has no fever or chills  She has no weight loss  There is no other definitive exacerbating or remitting factor for this  There is some concern about a possible inflammatory bowel disease  The patient is going for evaluation for a fibroid uterus  There is no family history of GI disease that she knows of  She has no other GI complaints at the present time  No nausea or vomiting  No dysphagia  No fever or chills  No other symptoms         Review of Systems:   Constitutional: No fever or chills, feels well, no tiredness, no recent weight gain or weight loss  HENT: No complaints of earache, no hearing loss, no nosebleeds, no nasal discharge, no sore throat, no hoarseness  Eyes: No complaints of eye pain, no red eyes, no discharge from eyes, no itchy eyes  Cardiovascular: No complaints of slow heart rate, no fast heart rate, no chest pain, no palpitations, no leg claudication, no lower extremity edema  Respiratory: No complaints of shortness of breath, no wheezing, no cough, no SOB on exertion, no orthopnea  Gastrointestinal: As noted in HPI  Genitourinary: No complaints of dysuria, no incontinence, no hesitancy, positive nocturia     Musculoskeletal: No complaints of arthralgia, no myalgias, no joint swelling or stiffness, no limb pain or swelling  Neurological: No complaints of headache, no confusion, no convulsions, no numbness or tingling, no dizziness or fainting, no limb weakness, no difficulty walking  Skin: No complaints of skin rash or skin lesions, no itching, no skin wound, no dry skin  Hematological/Lymphatic: No complaints of swollen glands, does not bleed easy  Allergic/Immunologic: No immunocompromised state  Endocrine:  No complaints of polyuria, no polydipsia  Psychiatric/Behavioral: is not suicidal, no sleep disturbances, no anxiety or depression, no change in personality, no emotional problems  Historical Information   Past Medical History:   Diagnosis Date    Fibroid uterus      Past Surgical History:   Procedure Laterality Date    DILATION AND CURETTAGE OF UTERUS       Social History   Social History     Substance and Sexual Activity   Alcohol Use Yes    Frequency: Monthly or less    Comment: socially     Social History     Substance and Sexual Activity   Drug Use Yes    Types: Marijuana     Social History     Tobacco Use   Smoking Status Current Every Day Smoker    Packs/day: 0 50    Types: Cigarettes   Smokeless Tobacco Never Used     Family History   Problem Relation Age of Onset    Lupus Mother          Current Medications: has a current medication list which includes the following prescription(s): dicyclomine, iron, baclofen, diclofenac sodium, and ferrous sulfate  Vital Signs: /70   Pulse 92   Resp 16   Ht 5' 6" (1 676 m)   Wt 77 1 kg (170 lb)   LMP 03/02/2020   BMI 27 44 kg/m²     Physical Exam:   Constitutional  General Appearance: No acute distress, well appearing and well nourished  Head  Normocephalic  Eyes  Conjunctivae and lids: No swelling, erythema, or discharge  Pupils and irises: Equal, round and reactive to light     Ears, Nose, Mouth, and Throat  External inspection of ears and nose: Normal  Nasal mucosa, septum and turbinates: Normal without edema or erythema/   Oropharynx: Normal with no erythema, edema, exudate or lesions  Neck  Normal range of motion  Neck supple  Cardiovascular  Auscultation of the heart: Normal rate and rhythm, normal S1 and S2 without murmurs  Examination of the extremities for edema and/or varicosities: Normal  Pulmonary/Chest  Respiratory effort: No increased work of breathing or signs of respiratory distress  Auscultation of lungs: Clear to auscultation, equal breath sounds bilaterally, no wheezes, rales, no rhonchi  Abdomen  Abdomen:  Mild to moderately right lower quadrant tenderness on palpation, no rebound  , no masses  Liver and spleen: No hepatomegaly or splenomegaly  Musculoskeletal  Gait and station: normal   Digits and Nails: normal without clubbing or cyanosis  Inspection/palpation of joints, bones, and muscles: Normal  Neurological  No nystagmus or asterixis  Skin  Skin and subcutaneous tissue: Normal without rashes or lesions  Lymphatic  Palpation of the lymph nodes in neck: No lymphadenopathy  Psychiatric  Orientation to person, place and time: Normal   Mood and affect: Normal          Labs:   Lab Results   Component Value Date    ALT 16 03/02/2020    AST 17 03/02/2020    BUN 11 03/02/2020    CALCIUM 8 5 03/02/2020     03/02/2020    CO2 28 03/02/2020    CREATININE 0 79 03/02/2020    HCT 31 9 (L) 03/02/2020    HGB 9 2 (L) 03/02/2020     03/02/2020    K 3 9 03/02/2020    WBC 5 79 03/02/2020         X-Rays & Procedures:   No orders to display         ______________________________________________________________________      Assessment & Plan:      Radha Tyson was seen today for colon consult      Diagnoses and all orders for this visit:    Right lower quadrant abdominal pain    Change in bowel habits    Loose stools      I have taken liberty of scheduling the patient for a colonoscopy with terminal ileoscopy and biopsy to rule out any inflammatory bowel disease  I will be happy to inform you of her results and any further recommendations  Would like to thank you for to participate in her care                With warmest regards,    Elvis Vergara MD, [facility]

## 2020-03-12 NOTE — PROGRESS NOTES
[facility]    Dear Dr Kirsten Raymundo,     I had the pleasure of seeing your patient Emir Salinas in the office today and I thank you for this kind referral        Chief Complaint:  Abdominal pain      HPI:  Emir Salinas is a 34 y o  female who presents with several months of lower abdominal pain  She points to the right lower quadrant and the right flank  CT scan revealed a fibroid uterus  However the patient also has a distinct change in bowel habits with loose stools  She has not seen any rectal bleeding  Ultrasound for the abdominal pain revealed only a small 4 mm gallbladder polyp  She has no melena or hematochezia  There is some decrease of the pain with a bowel movement  She has no nocturnal symptomatology  She has no fever or chills  She has no weight loss  There is no other definitive exacerbating or remitting factor for this  There is some concern about a possible inflammatory bowel disease  The patient is going for evaluation for a fibroid uterus  There is no family history of GI disease that she knows of  She has no other GI complaints at the present time  No nausea or vomiting  No dysphagia  No fever or chills  No other symptoms         Review of Systems:   Constitutional: No fever or chills, feels well, no tiredness, no recent weight gain or weight loss  HENT: No complaints of earache, no hearing loss, no nosebleeds, no nasal discharge, no sore throat, no hoarseness  Eyes: No complaints of eye pain, no red eyes, no discharge from eyes, no itchy eyes  Cardiovascular: No complaints of slow heart rate, no fast heart rate, no chest pain, no palpitations, no leg claudication, no lower extremity edema  Respiratory: No complaints of shortness of breath, no wheezing, no cough, no SOB on exertion, no orthopnea  Gastrointestinal: As noted in HPI  Genitourinary: No complaints of dysuria, no incontinence, no hesitancy, positive nocturia     Musculoskeletal: No complaints of arthralgia, no myalgias, no joint swelling or stiffness, no limb pain or swelling  Neurological: No complaints of headache, no confusion, no convulsions, no numbness or tingling, no dizziness or fainting, no limb weakness, no difficulty walking  Skin: No complaints of skin rash or skin lesions, no itching, no skin wound, no dry skin  Hematological/Lymphatic: No complaints of swollen glands, does not bleed easy  Allergic/Immunologic: No immunocompromised state  Endocrine:  No complaints of polyuria, no polydipsia  Psychiatric/Behavioral: is not suicidal, no sleep disturbances, no anxiety or depression, no change in personality, no emotional problems  Historical Information   Past Medical History:   Diagnosis Date    Fibroid uterus      Past Surgical History:   Procedure Laterality Date    DILATION AND CURETTAGE OF UTERUS       Social History   Social History     Substance and Sexual Activity   Alcohol Use Yes    Frequency: Monthly or less    Comment: socially     Social History     Substance and Sexual Activity   Drug Use Yes    Types: Marijuana     Social History     Tobacco Use   Smoking Status Current Every Day Smoker    Packs/day: 0 50    Types: Cigarettes   Smokeless Tobacco Never Used     Family History   Problem Relation Age of Onset    Lupus Mother          Current Medications: has a current medication list which includes the following prescription(s): dicyclomine, iron, baclofen, diclofenac sodium, and ferrous sulfate  Vital Signs: /70   Pulse 92   Resp 16   Ht 5' 6" (1 676 m)   Wt 77 1 kg (170 lb)   LMP 03/02/2020   BMI 27 44 kg/m²     Physical Exam:   Constitutional  General Appearance: No acute distress, well appearing and well nourished  Head  Normocephalic  Eyes  Conjunctivae and lids: No swelling, erythema, or discharge  Pupils and irises: Equal, round and reactive to light     Ears, Nose, Mouth, and Throat  External inspection of ears and nose: Normal  Nasal mucosa, septum and turbinates: Normal without edema or erythema/   Oropharynx: Normal with no erythema, edema, exudate or lesions  Neck  Normal range of motion  Neck supple  Cardiovascular  Auscultation of the heart: Normal rate and rhythm, normal S1 and S2 without murmurs  Examination of the extremities for edema and/or varicosities: Normal  Pulmonary/Chest  Respiratory effort: No increased work of breathing or signs of respiratory distress  Auscultation of lungs: Clear to auscultation, equal breath sounds bilaterally, no wheezes, rales, no rhonchi  Abdomen  Abdomen:  Mild to moderately right lower quadrant tenderness on palpation, no rebound  , no masses  Liver and spleen: No hepatomegaly or splenomegaly  Musculoskeletal  Gait and station: normal   Digits and Nails: normal without clubbing or cyanosis  Inspection/palpation of joints, bones, and muscles: Normal  Neurological  No nystagmus or asterixis  Skin  Skin and subcutaneous tissue: Normal without rashes or lesions  Lymphatic  Palpation of the lymph nodes in neck: No lymphadenopathy  Psychiatric  Orientation to person, place and time: Normal   Mood and affect: Normal          Labs:   Lab Results   Component Value Date    ALT 16 03/02/2020    AST 17 03/02/2020    BUN 11 03/02/2020    CALCIUM 8 5 03/02/2020     03/02/2020    CO2 28 03/02/2020    CREATININE 0 79 03/02/2020    HCT 31 9 (L) 03/02/2020    HGB 9 2 (L) 03/02/2020     03/02/2020    K 3 9 03/02/2020    WBC 5 79 03/02/2020         X-Rays & Procedures:   No orders to display         ______________________________________________________________________      Assessment & Plan:      Jim Madrigal was seen today for colon consult      Diagnoses and all orders for this visit:    Right lower quadrant abdominal pain    Change in bowel habits    Loose stools      I have taken liberty of scheduling the patient for a colonoscopy with terminal ileoscopy and biopsy to rule out any inflammatory bowel disease  I will be happy to inform you of her results and any further recommendations  Would like to thank you for to participate in her care                With warmest regards,    Ad Rocha MD, [facility]

## 2020-03-12 NOTE — LETTER
March 12, 2020     Holly Day MD  [address] [facility] [address] [facility]  [address]    Patient: Jose Cannon   YOB: 1990   Date of Visit: 3/12/2020       Dear Dr Claudia Wilde: Thank you for referring Jose Cannon to me for evaluation  Below are my notes for this consultation  If you have questions, please do not hesitate to call me  I look forward to following your patient along with you  Sincerely,        Vipul Knowles MD        CC: No Recipients  Vipul Knowles MD  3/12/2020  3:45 PM  Incomplete  [facility]    Dear Dr Saroj Arias,     I had the pleasure of seeing your patient Jose Cannon in the office today and I thank you for this kind referral        Chief Complaint:  Abdominal pain      HPI:  Jose Cannon is a 34 y o  female who presents with several months of lower abdominal pain  She points to the right lower quadrant and the right flank  CT scan revealed a fibroid uterus  However the patient also has a distinct change in bowel habits with loose stools  She has not seen any rectal bleeding  Ultrasound for the abdominal pain revealed only a small 4 mm gallbladder polyp  She has no melena or hematochezia  There is some decrease of the pain with a bowel movement  She has no nocturnal symptomatology  She has no fever or chills  She has no weight loss  There is no other definitive exacerbating or remitting factor for this  There is some concern about a possible inflammatory bowel disease  The patient is going for evaluation for a fibroid uterus  There is no family history of GI disease that she knows of  She has no other GI complaints at the present time  No nausea or vomiting  No dysphagia  No fever or chills  No other symptoms         Review of Systems:   Constitutional: No fever or chills, feels well, no tiredness, no recent weight gain or weight loss     HENT: No complaints of earache, no hearing loss, no nosebleeds, no nasal discharge, no sore throat, no hoarseness  Eyes: No complaints of eye pain, no red eyes, no discharge from eyes, no itchy eyes  Cardiovascular: No complaints of slow heart rate, no fast heart rate, no chest pain, no palpitations, no leg claudication, no lower extremity edema  Respiratory: No complaints of shortness of breath, no wheezing, no cough, no SOB on exertion, no orthopnea  Gastrointestinal: As noted in HPI  Genitourinary: No complaints of dysuria, no incontinence, no hesitancy, positive nocturia  Musculoskeletal: No complaints of arthralgia, no myalgias, no joint swelling or stiffness, no limb pain or swelling  Neurological: No complaints of headache, no confusion, no convulsions, no numbness or tingling, no dizziness or fainting, no limb weakness, no difficulty walking  Skin: No complaints of skin rash or skin lesions, no itching, no skin wound, no dry skin  Hematological/Lymphatic: No complaints of swollen glands, does not bleed easy  Allergic/Immunologic: No immunocompromised state  Endocrine:  No complaints of polyuria, no polydipsia  Psychiatric/Behavioral: is not suicidal, no sleep disturbances, no anxiety or depression, no change in personality, no emotional problems         Historical Information   Past Medical History:   Diagnosis Date    Fibroid uterus      Past Surgical History:   Procedure Laterality Date    DILATION AND CURETTAGE OF UTERUS       Social History   Social History     Substance and Sexual Activity   Alcohol Use Yes    Frequency: Monthly or less    Comment: socially     Social History     Substance and Sexual Activity   Drug Use Yes    Types: Marijuana     Social History     Tobacco Use   Smoking Status Current Every Day Smoker    Packs/day: 0 50    Types: Cigarettes   Smokeless Tobacco Never Used     Family History   Problem Relation Age of Onset    Lupus Mother          Current Medications: has a current medication list which includes the following prescription(s): dicyclomine, iron, baclofen, diclofenac sodium, and ferrous sulfate  Vital Signs: /70   Pulse 92   Resp 16   Ht 5' 6" (1 676 m)   Wt 77 1 kg (170 lb)   LMP 03/02/2020   BMI 27 44 kg/m²      Physical Exam:   Constitutional  General Appearance: No acute distress, well appearing and well nourished  Head  Normocephalic  Eyes  Conjunctivae and lids: No swelling, erythema, or discharge  Pupils and irises: Equal, round and reactive to light  Ears, Nose, Mouth, and Throat  External inspection of ears and nose: Normal  Nasal mucosa, septum and turbinates: Normal without edema or erythema/   Oropharynx: Normal with no erythema, edema, exudate or lesions  Neck  Normal range of motion  Neck supple  Cardiovascular  Auscultation of the heart: Normal rate and rhythm, normal S1 and S2 without murmurs  Examination of the extremities for edema and/or varicosities: Normal  Pulmonary/Chest  Respiratory effort: No increased work of breathing or signs of respiratory distress  Auscultation of lungs: Clear to auscultation, equal breath sounds bilaterally, no wheezes, rales, no rhonchi  Abdomen  Abdomen:  Mild to moderately right lower quadrant tenderness on palpation, no rebound  , no masses  Liver and spleen: No hepatomegaly or splenomegaly  Musculoskeletal  Gait and station: normal   Digits and Nails: normal without clubbing or cyanosis  Inspection/palpation of joints, bones, and muscles: Normal  Neurological  No nystagmus or asterixis  Skin  Skin and subcutaneous tissue: Normal without rashes or lesions  Lymphatic  Palpation of the lymph nodes in neck: No lymphadenopathy     Psychiatric  Orientation to person, place and time: Normal   Mood and affect: Normal          Labs:   Lab Results   Component Value Date    ALT 16 03/02/2020    AST 17 03/02/2020    BUN 11 03/02/2020    CALCIUM 8 5 03/02/2020     03/02/2020    CO2 28 03/02/2020    CREATININE 0 79 03/02/2020    HCT 31 9 (L) 03/02/2020    HGB 9 2 (L) 03/02/2020     03/02/2020    K 3 9 03/02/2020    WBC 5 79 03/02/2020         X-Rays & Procedures:   No orders to display         ______________________________________________________________________      Assessment & Plan:      Radha Tyson was seen today for colon consult  Diagnoses and all orders for this visit:    Right lower quadrant abdominal pain    Change in bowel habits    Loose stools      I have taken liberty of scheduling the patient for a colonoscopy with terminal ileoscopy and biopsy to rule out any inflammatory bowel disease  I will be happy to inform you of her results and any further recommendations  Would like to thank you for to participate in her care                With warmest regards,    Tommie Walker MD, [facility]

## 2020-03-14 ENCOUNTER — ANESTHESIA EVENT (OUTPATIENT)
Dept: GASTROENTEROLOGY | Facility: HOSPITAL | Age: 30
End: 2020-03-14

## 2020-03-14 ENCOUNTER — ANESTHESIA (OUTPATIENT)
Dept: GASTROENTEROLOGY | Facility: HOSPITAL | Age: 30
End: 2020-03-14

## 2020-03-14 ENCOUNTER — HOSPITAL ENCOUNTER (OUTPATIENT)
Dept: GASTROENTEROLOGY | Facility: HOSPITAL | Age: 30
Setting detail: OUTPATIENT SURGERY
Discharge: HOME | End: 2020-03-14
Attending: INTERNAL MEDICINE
Payer: COMMERCIAL

## 2020-03-14 VITALS
RESPIRATION RATE: 19 BRPM | HEART RATE: 55 BPM | WEIGHT: 169.09 LBS | BODY MASS INDEX: 27.18 KG/M2 | SYSTOLIC BLOOD PRESSURE: 105 MMHG | HEIGHT: 66 IN | OXYGEN SATURATION: 93 % | TEMPERATURE: 97.8 F | DIASTOLIC BLOOD PRESSURE: 62 MMHG

## 2020-03-14 DIAGNOSIS — R10.31 RIGHT LOWER QUADRANT ABDOMINAL PAIN: ICD-10-CM

## 2020-03-14 DIAGNOSIS — R19.4 CHANGE IN BOWEL HABITS: ICD-10-CM

## 2020-03-14 DIAGNOSIS — R19.5 LOOSE STOOLS: ICD-10-CM

## 2020-03-14 LAB
EXT PREGNANCY TEST URINE: NEGATIVE
EXT. CONTROL: NORMAL

## 2020-03-14 PROCEDURE — 45380 COLONOSCOPY AND BIOPSY: CPT | Performed by: INTERNAL MEDICINE

## 2020-03-14 PROCEDURE — 88305 TISSUE EXAM BY PATHOLOGIST: CPT | Performed by: PATHOLOGY

## 2020-03-14 PROCEDURE — 81025 URINE PREGNANCY TEST: CPT | Performed by: INTERNAL MEDICINE

## 2020-03-14 RX ORDER — SODIUM CHLORIDE, SODIUM LACTATE, POTASSIUM CHLORIDE, CALCIUM CHLORIDE 600; 310; 30; 20 MG/100ML; MG/100ML; MG/100ML; MG/100ML
125 INJECTION, SOLUTION INTRAVENOUS CONTINUOUS
Status: DISCONTINUED | OUTPATIENT
Start: 2020-03-14 | End: 2020-03-18 | Stop reason: HOSPADM

## 2020-03-14 RX ORDER — SODIUM CHLORIDE, SODIUM LACTATE, POTASSIUM CHLORIDE, CALCIUM CHLORIDE 600; 310; 30; 20 MG/100ML; MG/100ML; MG/100ML; MG/100ML
INJECTION, SOLUTION INTRAVENOUS CONTINUOUS PRN
Status: DISCONTINUED | OUTPATIENT
Start: 2020-03-14 | End: 2020-03-14 | Stop reason: SURG

## 2020-03-14 RX ORDER — PROPOFOL 10 MG/ML
INJECTION, EMULSION INTRAVENOUS AS NEEDED
Status: DISCONTINUED | OUTPATIENT
Start: 2020-03-14 | End: 2020-03-14 | Stop reason: SURG

## 2020-03-14 RX ADMIN — PROPOFOL 100 MG: 10 INJECTION, EMULSION INTRAVENOUS at 08:30

## 2020-03-14 RX ADMIN — SODIUM CHLORIDE, SODIUM LACTATE, POTASSIUM CHLORIDE, AND CALCIUM CHLORIDE: .6; .31; .03; .02 INJECTION, SOLUTION INTRAVENOUS at 08:28

## 2020-03-14 RX ADMIN — PROPOFOL 50 MG: 10 INJECTION, EMULSION INTRAVENOUS at 08:40

## 2020-03-14 RX ADMIN — PROPOFOL 100 MG: 10 INJECTION, EMULSION INTRAVENOUS at 08:36

## 2020-03-14 RX ADMIN — PROPOFOL 100 MG: 10 INJECTION, EMULSION INTRAVENOUS at 08:31

## 2020-03-14 NOTE — DISCHARGE INSTRUCTIONS
Colonoscopy   WHAT YOU NEED TO KNOW:   A colonoscopy is a procedure to examine the inside of your colon (intestine) with a scope  Polyps or tissue growths may have been removed during your colonoscopy  It is normal to feel bloated and to have some abdominal discomfort  You should be passing gas  If you have hemorrhoids or you had polyps removed, you may have a small amount of bleeding  DISCHARGE INSTRUCTIONS:   Seek care immediately if:   · You have a large amount of bright red blood in your bowel movements  · Your abdomen is hard and firm and you have severe pain  · You have sudden trouble breathing  Contact your healthcare provider if:   · You develop a rash or hives  · You have a fever within 24 hours of your procedure  · You have not had a bowel movement for 3 days after your procedure  · You have questions or concerns about your condition or care  Activity:   · Do not lift, strain, or run  for 3 days after your procedure  · Rest after your procedure  You have been given medicine to relax you  Do not  drive or make important decisions until the day after your procedure  Return to your normal activity as directed  · Relieve gas and discomfort from bloating  by lying on your right side with a heating pad on your abdomen  You may need to take short walks to help the gas move out  Eat small meals until bloating is relieved  If you had polyps removed: For 7 days after your procedure:  · Do not  take aspirin  · Do not  go on long car rides  Help prevent constipation:   · Eat a variety of healthy foods  Healthy foods include fruit, vegetables, whole-grain breads, low-fat dairy products, beans, lean meat, and fish  Ask if you need to be on a special diet  Your healthcare provider may recommend that you eat high-fiber foods such as cooked beans  Fiber helps you have regular bowel movements  · Drink liquids as directed    Adults should drink between 9 and 13 eight-ounce cups of liquid every day  Ask what amount is best for you  For most people, good liquids to drink are water, juice, and milk  · Exercise as directed  Talk to your healthcare provider about the best exercise plan for you  Exercise can help prevent constipation, decrease your blood pressure and improve your health  Follow up with your healthcare provider as directed:  Write down your questions so you remember to ask them during your visits  © 2017 [employer] Information is for End User's use only and may not be sold, redistributed or otherwise used for commercial purposes  All illustrations and images included in CareNotes® are the copyrighted property of [employer]  or [employer]awn [employer]  The above information is an  only  It is not intended as medical advice for individual conditions or treatments  Talk to your doctor, nurse or pharmacist before following any medical regimen to see if it is safe and effective for you

## 2020-03-14 NOTE — ANESTHESIA POSTPROCEDURE EVALUATION
Post-Op Assessment Note    CV Status:  Stable  Pain Score: 1    Pain management: adequate     Mental Status:  Alert   Hydration Status:  Stable   PONV Controlled:  Controlled   Post Op Vitals Reviewed: Yes      Staff: CRNA, Anesthesiologist           BP      Temp      Pulse     Resp      SpO2

## 2020-03-14 NOTE — ANESTHESIA PREPROCEDURE EVALUATION
Review of Systems/Medical History  Patient summary reviewed  Chart reviewed  No history of anesthetic complications     Cardiovascular   Pulmonary  Smoker cigarette smoker  ,        GI/Hepatic      Comment: Right lower quadrant abdominal pain      Change in bowel habits      Loose stools           Endo/Other     GYN       Hematology   Musculoskeletal       Neurology   Psychology         PSH:    DILATION AND CURETTAGE OF UTERUS  Physical Exam    Airway    Mallampati score: II  TM Distance: >3 FB  Neck ROM: full     Dental   No notable dental hx     Cardiovascular  Cardiovascular exam normal    Pulmonary  Pulmonary exam normal Breath sounds clear to auscultation,     Other Findings        Anesthesia Plan  ASA Score- 2     Anesthesia Type- IV sedation with anesthesia with ASA Monitors  Additional Monitors:   Airway Plan:         Plan Factors- Patient instructed to abstain from smoking on day of procedure       Induction- intravenous  Postoperative Plan-     Informed Consent- Anesthetic plan and risks discussed with patient  I personally reviewed this patient with the CRNA  Discussed and agreed on the Anesthesia Plan with the CRNA             Lab Results   Component Value Date    WBC 5 79 03/02/2020    HGB 9 2 (L) 03/02/2020    HCT 31 9 (L) 03/02/2020    MCV 75 (L) 03/02/2020     03/02/2020     Lab Results   Component Value Date    CALCIUM 8 5 03/02/2020    K 3 9 03/02/2020    CO2 28 03/02/2020     03/02/2020    BUN 11 03/02/2020    CREATININE 0 79 03/02/2020         Discussed with pt the benefits/alternatives and risks or General Anesthesia including breathing tube remaining in place if not strong enough, PONV, damage to lips and teeth, sore throat, eye injury or blindness      I, Dr Junior Benton, the attending physician, have personally seen and evaluated the patient prior to anesthetic care   I have reviewed the pre-anesthetic record, and other medical records if appropriate to the anesthetic care  If a CRNA is involved in the case, I have reviewed the CRNA assessment, if present, and agree  The patient is in a suitable condition to proceed with my formulated anesthetic plan

## 2020-03-14 NOTE — INTERVAL H&P NOTE
H&P reviewed  After examining the patient I find no changes in the patients condition since the H&P had been written      Vitals:    03/14/20 0739   BP: 113/61   Pulse: 72   Resp: 18   Temp: 98 2 °F (36 8 °C)   SpO2: 100%

## 2020-06-09 ENCOUNTER — HOSPITAL ENCOUNTER (EMERGENCY)
Facility: HOSPITAL | Age: 30
Discharge: HOME | End: 2020-06-09
Attending: EMERGENCY MEDICINE
Payer: COMMERCIAL

## 2020-06-09 VITALS
SYSTOLIC BLOOD PRESSURE: 125 MMHG | OXYGEN SATURATION: 100 % | TEMPERATURE: 98.3 F | DIASTOLIC BLOOD PRESSURE: 84 MMHG | HEART RATE: 72 BPM | RESPIRATION RATE: 16 BRPM

## 2020-06-09 DIAGNOSIS — S41.112A ARM LACERATION, LEFT, INITIAL ENCOUNTER: Primary | ICD-10-CM

## 2020-06-09 PROCEDURE — 12002 RPR S/N/AX/GEN/TRNK2.6-7.5CM: CPT | Performed by: PHYSICIAN ASSISTANT

## 2020-06-09 PROCEDURE — 99283 EMERGENCY DEPT VISIT LOW MDM: CPT

## 2020-06-09 PROCEDURE — 99282 EMERGENCY DEPT VISIT SF MDM: CPT | Performed by: PHYSICIAN ASSISTANT

## 2020-06-09 RX ORDER — LIDOCAINE HYDROCHLORIDE AND EPINEPHRINE 10; 10 MG/ML; UG/ML
20 INJECTION, SOLUTION INFILTRATION; PERINEURAL ONCE
Status: COMPLETED | OUTPATIENT
Start: 2020-06-09 | End: 2020-06-09

## 2020-06-09 RX ADMIN — LIDOCAINE HYDROCHLORIDE,EPINEPHRINE BITARTRATE 20 ML: 10; .01 INJECTION, SOLUTION INFILTRATION; PERINEURAL at 22:36

## 2023-09-26 ENCOUNTER — TELEPHONE (OUTPATIENT)
Dept: OBGYN CLINIC | Facility: HOSPITAL | Age: 33
End: 2023-09-26

## 2023-09-26 NOTE — TELEPHONE ENCOUNTER
Caller: Patient    Doctor/Office: Podiatry    Call regarding :  Ganglion on foot     Call was transferred to: Podiatry

## 2023-10-02 ENCOUNTER — HOSPITAL ENCOUNTER (EMERGENCY)
Facility: HOSPITAL | Age: 33
Discharge: HOME | End: 2023-10-02
Attending: EMERGENCY MEDICINE
Payer: COMMERCIAL

## 2023-10-02 VITALS
OXYGEN SATURATION: 100 % | RESPIRATION RATE: 18 BRPM | HEART RATE: 62 BPM | DIASTOLIC BLOOD PRESSURE: 61 MMHG | TEMPERATURE: 97.7 F | SYSTOLIC BLOOD PRESSURE: 121 MMHG

## 2023-10-02 DIAGNOSIS — S61.512A WRIST LACERATION, LEFT, INITIAL ENCOUNTER: Primary | ICD-10-CM

## 2023-10-02 PROCEDURE — 99282 EMERGENCY DEPT VISIT SF MDM: CPT

## 2023-10-02 PROCEDURE — 12001 RPR S/N/AX/GEN/TRNK 2.5CM/<: CPT

## 2023-10-02 PROCEDURE — 99284 EMERGENCY DEPT VISIT MOD MDM: CPT

## 2023-10-02 RX ORDER — GINSENG 100 MG
1 CAPSULE ORAL ONCE
Status: COMPLETED | OUTPATIENT
Start: 2023-10-02 | End: 2023-10-02

## 2023-10-02 RX ORDER — LIDOCAINE HYDROCHLORIDE 10 MG/ML
5 INJECTION, SOLUTION EPIDURAL; INFILTRATION; INTRACAUDAL; PERINEURAL ONCE
Status: COMPLETED | OUTPATIENT
Start: 2023-10-02 | End: 2023-10-02

## 2023-10-02 RX ADMIN — BACITRACIN 1 SMALL APPLICATION: 500 OINTMENT TOPICAL at 12:48

## 2023-10-02 RX ADMIN — LIDOCAINE HYDROCHLORIDE 5 ML: 10 INJECTION, SOLUTION EPIDURAL; INFILTRATION; INTRACAUDAL at 12:26

## 2023-10-02 NOTE — DISCHARGE INSTRUCTIONS
Follow with PCP  Keep area clean and dry  Suture removal in 10-14 days  Return to the ED with new or worsening symptoms including but not limited to pain, redness, swelling, discharge

## 2023-10-02 NOTE — ED PROVIDER NOTES
History  Chief Complaint   Patient presents with   • Wrist Laceration     Pt presents with c/o laceration to L wrist, covered with band aid on arrival with no obvious bleeding. States a bowl broke and that is how she lacerated her wrist. Tetanus is up to date per patient report. Patient is a 77-year-old female with a past medical history of fibroid uterus presents emergency department for evaluation of a wrist laceration. Patient states she cut herself on a glass bowl this morning. Patient states she noticed bleeding from the area and covered with a Band-Aid. Patient states she is up-to-date on her tetanus. Patient denies any pain to the area. Bleeding is controlled on presentation. Patient has no other concerns or complaints at this time. Prior to Admission Medications   Prescriptions Last Dose Informant Patient Reported? Taking? Ferrous Sulfate (IRON) 325 (65 Fe) MG TABS   Yes No   Sig: Take 1 tablet by mouth daily   baclofen 10 mg tablet   Yes No   Sig: TAKE 1 TABLET BY MOUTH TWICE DAILY AS NEEDED FOR PAIN OR MUSCLE SPASMS   dicyclomine (BENTYL) 20 mg tablet   Yes No   Sig: TAKE 1 TABLET BY MOUTH 4 TIMES DAILY AS NEEDED FOR PAIN FOR ABDOMINAL PAIN   ferrous sulfate 325 (65 Fe) mg tablet   Yes No   Sig: Take 325 mg by mouth      Facility-Administered Medications: None       Past Medical History:   Diagnosis Date   • Fibroid uterus        Past Surgical History:   Procedure Laterality Date   • DILATION AND CURETTAGE OF UTERUS         Family History   Problem Relation Age of Onset   • Lupus Mother      I have reviewed and agree with the history as documented.     E-Cigarette/Vaping   • E-Cigarette Use Never User      E-Cigarette/Vaping Substances     Social History     Tobacco Use   • Smoking status: Every Day     Packs/day: 0.50     Types: Cigarettes   • Smokeless tobacco: Never   Vaping Use   • Vaping Use: Never used   Substance Use Topics   • Alcohol use: Yes     Comment: socially   • Drug use: Yes     Types: Marijuana       Review of Systems   Constitutional: Negative for chills and fever. HENT: Negative for ear pain and sore throat. Eyes: Negative for pain and visual disturbance. Respiratory: Negative for cough and shortness of breath. Cardiovascular: Negative for chest pain and palpitations. Gastrointestinal: Negative for abdominal pain and vomiting. Genitourinary: Negative for dysuria and hematuria. Musculoskeletal: Negative for arthralgias and back pain. Skin: Negative for color change and rash. Laceration to left wist   Neurological: Negative for seizures and syncope. All other systems reviewed and are negative. Physical Exam  Physical Exam  Vitals and nursing note reviewed. Constitutional:       General: She is not in acute distress. Appearance: Normal appearance. She is well-developed. She is not toxic-appearing or diaphoretic. HENT:      Head: Normocephalic and atraumatic. Right Ear: External ear normal.      Left Ear: External ear normal.      Nose: Nose normal.      Mouth/Throat:      Mouth: Mucous membranes are moist.   Eyes:      General: No scleral icterus. Right eye: No discharge. Left eye: No discharge. Conjunctiva/sclera: Conjunctivae normal.   Cardiovascular:      Rate and Rhythm: Normal rate and regular rhythm. Heart sounds: No murmur heard. Pulmonary:      Effort: Pulmonary effort is normal. No respiratory distress. Breath sounds: Normal breath sounds. Abdominal:      Palpations: Abdomen is soft. Tenderness: There is no abdominal tenderness. Musculoskeletal:         General: No swelling, deformity or signs of injury. Normal range of motion. Cervical back: Normal range of motion and neck supple. No rigidity. Comments: 1.5cm superficial laceration to the left lateral wrist. No active bleeding   Skin:     General: Skin is warm and dry.       Capillary Refill: Capillary refill takes less than 2 seconds. Coloration: Skin is not jaundiced. Findings: No erythema or rash. Neurological:      General: No focal deficit present. Mental Status: She is alert and oriented to person, place, and time. Mental status is at baseline. Cranial Nerves: No cranial nerve deficit. Gait: Gait normal.   Psychiatric:         Mood and Affect: Mood normal.         Behavior: Behavior normal.         Thought Content: Thought content normal.         Judgment: Judgment normal.         Vital Signs  ED Triage Vitals   Temperature Pulse Respirations Blood Pressure SpO2   10/02/23 1129 10/02/23 1129 10/02/23 1129 10/02/23 1129 10/02/23 1129   97.7 °F (36.5 °C) 62 18 121/61 100 %      Temp Source Heart Rate Source Patient Position - Orthostatic VS BP Location FiO2 (%)   10/02/23 1129 10/02/23 1129 10/02/23 1129 10/02/23 1129 --   Temporal Monitor Sitting Right arm       Pain Score       10/02/23 1130       5           Vitals:    10/02/23 1129   BP: 121/61   Pulse: 62   Patient Position - Orthostatic VS: Sitting         Visual Acuity      ED Medications  Medications   lidocaine (PF) (XYLOCAINE-MPF) 1 % injection 5 mL (5 mL Infiltration Given by Other 10/2/23 1226)   bacitracin topical ointment 1 small application (1 small application Topical Given 10/2/23 1248)       Diagnostic Studies  Results Reviewed     None                 No orders to display              Procedures  Universal Protocol:  Consent: Verbal consent obtained.   Risks and benefits: risks, benefits and alternatives were discussed  Consent given by: patient  Patient identity confirmed: verbally with patient and arm band    Laceration repair    Date/Time: 10/2/2023 12:00 PM    Performed by: Lynette Shell PA-C  Authorized by: Lynette Shell PA-C  Body area: upper extremity  Location details: left wrist  Laceration length: 2 cm  Foreign bodies: no foreign bodies  Tendon involvement: none  Nerve involvement: none  Anesthesia: local infiltration    Anesthesia:  Local Anesthetic: lidocaine 1% without epinephrine  Anesthetic total: 2 mL    Wound Dehiscence:  Superficial Wound Dehiscence: simple closure      Procedure Details:  Preparation: Patient was prepped and draped in the usual sterile fashion. Irrigation solution: saline  Irrigation method: jet lavage  Amount of cleaning: standard  Debridement: none  Degree of undermining: none  Number of sutures: 2  Technique: simple  Approximation: close  Approximation difficulty: simple  Dressing: antibiotic ointment and 4x4 sterile gauze  Patient tolerance: patient tolerated the procedure well with no immediate complications               ED Course             SBIRT 20yo+    Flowsheet Row Most Recent Value   Initial Alcohol Screen: US AUDIT-C     1. How often do you have a drink containing alcohol? 0 Filed at: 10/02/2023 1144   2. How many drinks containing alcohol do you have on a typical day you are drinking? 0 Filed at: 10/02/2023 1144   3a. Male UNDER 65: How often do you have five or more drinks on one occasion? 0 Filed at: 10/02/2023 1144   3b. FEMALE Any Age, or MALE 65+: How often do you have 4 or more drinks on one occassion? 0 Filed at: 10/02/2023 1144   Audit-C Score 0 Filed at: 10/02/2023 1144   TEO: How many times in the past year have you. .. Used an illegal drug or used a prescription medication for non-medical reasons? Never Filed at: 10/02/2023 1144                    Medical Decision Making    Differential diagnosis to include but is not limited to: laceration    Initial ED Plan: laceration repair    ED results: Patient tolerated procedure without difficulty. Final ED assessment: Patient is stable and well appearing. Discussed follow up with PCP. Discussed suture care and removal time. Strict return precautions were discussed including but not limited to redness, pain, swelling, discharge. Patient verbalized understanding and is agreeable with the plan for discharge. Risk  OTC drugs. Prescription drug management. Disposition  Final diagnoses:   Wrist laceration, left, initial encounter     Time reflects when diagnosis was documented in both MDM as applicable and the Disposition within this note     Time User Action Codes Description Comment    10/2/2023 12:42 PM Jasiel Vega Add [K16.221D] Wrist laceration, left, initial encounter       ED Disposition     ED Disposition   Discharge    Condition   Stable    Date/Time   Mon Oct 2, 2023 12:42 PM    Comment   Tonia Tena discharge to home/self care. Follow-up Information     Follow up With Specialties Details Why Contact Info Additional Information    your PCP  Call in 3 days For follow up      [facility] Emergency Medicine Go to  If symptoms worsen [address] [facility], [place], [place], [facility]          Discharge Medication List as of 10/2/2023 12:44 PM      CONTINUE these medications which have NOT CHANGED    Details   baclofen 10 mg tablet TAKE 1 TABLET BY MOUTH TWICE DAILY AS NEEDED FOR PAIN OR MUSCLE SPASMS, Historical Med      dicyclomine (BENTYL) 20 mg tablet TAKE 1 TABLET BY MOUTH 4 TIMES DAILY AS NEEDED FOR PAIN FOR ABDOMINAL PAIN, Historical Med      !! Ferrous Sulfate (IRON) 325 (65 Fe) MG TABS Take 1 tablet by mouth daily, Starting Fri 3/6/2020, Historical Med      !! ferrous sulfate 325 (65 Fe) mg tablet Take 325 mg by mouth, Starting Fri 3/6/2020, Historical Med       !! - Potential duplicate medications found. Please discuss with provider. No discharge procedures on file.     PDMP Review     None          ED Provider  Electronically Signed by           Shyam Recinos PA-C  10/02/23 2837

## 2023-10-13 DIAGNOSIS — M67.40 GANGLION CYST: Primary | ICD-10-CM
